# Patient Record
Sex: MALE | Race: WHITE | Employment: OTHER | URBAN - METROPOLITAN AREA
[De-identification: names, ages, dates, MRNs, and addresses within clinical notes are randomized per-mention and may not be internally consistent; named-entity substitution may affect disease eponyms.]

---

## 2017-08-20 ENCOUNTER — ED HISTORICAL/CONVERTED ENCOUNTER (OUTPATIENT)
Dept: OTHER | Age: 77
End: 2017-08-20

## 2017-10-03 ENCOUNTER — HOSPITAL ENCOUNTER (OUTPATIENT)
Dept: CT IMAGING | Age: 77
Discharge: HOME | End: 2017-10-03
Attending: ORTHOPAEDIC SURGERY
Payer: MEDICARE

## 2017-10-03 ENCOUNTER — HOSPITAL ENCOUNTER (OUTPATIENT)
Dept: GENERAL RADIOLOGY | Age: 77
Discharge: HOME | End: 2017-10-03
Attending: ORTHOPAEDIC SURGERY
Payer: MEDICARE

## 2017-10-03 VITALS
HEART RATE: 70 BPM | SYSTOLIC BLOOD PRESSURE: 165 MMHG | DIASTOLIC BLOOD PRESSURE: 99 MMHG | OXYGEN SATURATION: 97 % | RESPIRATION RATE: 20 BRPM

## 2017-10-03 DIAGNOSIS — M48.061 SPINAL STENOSIS, LUMBAR: ICD-10-CM

## 2017-10-03 PROCEDURE — 72132 CT LUMBAR SPINE W/DYE: CPT

## 2017-10-03 PROCEDURE — 74011636320 HC RX REV CODE- 636/320: Performed by: RADIOLOGY

## 2017-10-03 PROCEDURE — 77030018868 HC TY MYELGRM BD -A

## 2017-10-03 PROCEDURE — 62304 MYELOGRAPHY LUMBAR INJECTION: CPT

## 2017-10-03 PROCEDURE — 77030003666 HC NDL SPINAL BD -A

## 2017-10-03 RX ORDER — LIDOCAINE HYDROCHLORIDE 10 MG/ML
5 INJECTION, SOLUTION EPIDURAL; INFILTRATION; INTRACAUDAL; PERINEURAL
Status: DISCONTINUED | OUTPATIENT
Start: 2017-10-03 | End: 2017-10-03

## 2017-10-03 RX ADMIN — IOHEXOL 20 ML: 180 INJECTION INTRAVENOUS at 12:56

## 2017-10-03 NOTE — PROGRESS NOTES
Pt brought to [facility] on a stretcher laying supine. Pt held in the hallway until available space in [facility]. 1313 - Pt procedure site c/d/i.  VS taken (see doc flow). Pain reported as 0/10. Pt stated he has weakness vs pain. 1323 - Reviewed discharge instructions with patient and provided copy. Pt able to verbalize understanding and shared that he had a previous Myelogram approximately 4 years ago. Pt advised his BP was elevated during his post recovery the last time he was here and he has a follow up Cardiology appointment in the near future. Pt resting and drinking fluids. Pt's ride has been called per his request.    1329 - Pt resting. No complaints. 5 - Pt assisted with dressing. Procedure site c/d/i. Pain 0/10 but weakness noted when Pt ambulated to the wheel chair for discharge. Pt stated this is his baseline and reason for procedure. Pt given disc and signed authorization.   Pt taken in wheel chair to vehicle and female SO.

## 2017-10-03 NOTE — DISCHARGE INSTRUCTIONS
[address] [address]      POST LUMBAR PUNCTURE/MYELOGRAM/INTRATHECAL CHEMOTHERAPY DISCHARGE INSTRUCTIONS  General Information:  Lumbar Puncture: A LP is done to help diagnose several disorders, like pseudo tumor, migraines, meningitis, and multiple sclerosis. It involves a puncture (usually in the lower spine) into the sac that protects the spinal column. A sample of the fluid in that space is removed and tested in the lab. Myelogram:   A Myelogram involves a lumbar puncture, and instead of removing fluid, contrast will be injected into the sac surrounding the spinal column. It is done to visualize the spinal column, nerve roots, spinal canal, vertebral discs and disc space. It is usually done to diagnose back pain with unknown cause or in preparation for surgery. After the injection, a CT scan will be done, usually within two hours of the injection. Intrathecal Chemotherapy:   Chemotherapy can be given in many forms. Intrathecal chemo involves a lumbar puncture, and instead of removing fluid, the chemo will be injected into the space. After any of these procedures, you will be asked to lie flat on your back for 4-6 hours to prevent complications. You should also rest for 24 hours after you go home, and force fluids. If you have a headache, you should take Tylenol or acetaminophen. Call If:   You should call your Physician and/or the Radiology Nurse if you develop a headache that is not relieved by Tylenol, and worsens when you stand and eases when you lie down, you need to call. You may have developed what is referred to as a spinal headache. Our physicians will probably advise you to be on strict bed rest for 24 hours, to drink lots of fluids and caffeine. If this does not help the head pain, call again the next day. You should call if you have bleeding other than a small spot on your bandage.  You should call if you have any numbness, tingling, weakness, fever, chills, urinary retention, severe itching, rash, welts, swelling, or confusion. Follow-up Instructions: See the doctor who ordered your procedure as he/she has instructed. If you had a Lumbar Puncture or Myelogram, your results should be available to your ordering doctor in 3-5 business days. You can remove your dressing in 24 hours and shower regularly. Do not bathe or swim for 72 hours.        To Reach Us: [phone] or after hours call [phone]  Patient Signature:  Date: 10/3/2017  Discharging Nurse: Raoul Simmonds, RN

## 2024-01-26 ENCOUNTER — HOSPITAL ENCOUNTER (EMERGENCY)
Facility: HOSPITAL | Age: 84
Discharge: ANOTHER HOSPITAL | End: 2024-01-27
Attending: STUDENT IN AN ORGANIZED HEALTH CARE EDUCATION/TRAINING PROGRAM
Payer: MEDICARE

## 2024-01-26 DIAGNOSIS — D72.829 LEUKOCYTOSIS, UNSPECIFIED TYPE: ICD-10-CM

## 2024-01-26 DIAGNOSIS — N28.9 RENAL INSUFFICIENCY: ICD-10-CM

## 2024-01-26 DIAGNOSIS — D62 ACUTE BLOOD LOSS ANEMIA: ICD-10-CM

## 2024-01-26 DIAGNOSIS — K92.2 UPPER GI BLEED: Primary | ICD-10-CM

## 2024-01-26 LAB
ALBUMIN SERPL-MCNC: 2.2 G/DL (ref 3.5–5)
ALBUMIN/GLOB SERPL: 0.5 (ref 1.1–2.2)
ALP SERPL-CCNC: 120 U/L (ref 45–117)
ALT SERPL-CCNC: 17 U/L (ref 12–78)
ANION GAP SERPL CALC-SCNC: 5 MMOL/L (ref 5–15)
AST SERPL W P-5'-P-CCNC: 39 U/L (ref 15–37)
BASOPHILS # BLD: 0 K/UL (ref 0–0.1)
BASOPHILS NFR BLD: 0 % (ref 0–1)
BILIRUB SERPL-MCNC: 0.4 MG/DL (ref 0.2–1)
BUN SERPL-MCNC: 78 MG/DL (ref 6–20)
BUN/CREAT SERPL: 39 (ref 12–20)
CA-I BLD-MCNC: 10.6 MG/DL (ref 8.5–10.1)
CHLORIDE SERPL-SCNC: 114 MMOL/L (ref 97–108)
CO2 SERPL-SCNC: 28 MMOL/L (ref 21–32)
CREAT SERPL-MCNC: 2.01 MG/DL (ref 0.7–1.3)
DIFFERENTIAL METHOD BLD: ABNORMAL
EOSINOPHIL # BLD: 0 K/UL (ref 0–0.4)
EOSINOPHIL NFR BLD: 0 % (ref 0–7)
ERYTHROCYTE [DISTWIDTH] IN BLOOD BY AUTOMATED COUNT: 17.5 % (ref 11.5–14.5)
GLOBULIN SER CALC-MCNC: 4.2 G/DL (ref 2–4)
GLUCOSE SERPL-MCNC: 182 MG/DL (ref 65–100)
HCT VFR BLD AUTO: 21 % (ref 36.6–50.3)
HGB BLD-MCNC: 6.1 G/DL (ref 12.1–17)
IMM GRANULOCYTES # BLD AUTO: 0.2 K/UL (ref 0–0.04)
IMM GRANULOCYTES NFR BLD AUTO: 1 % (ref 0–0.5)
LYMPHOCYTES # BLD: 0.7 K/UL (ref 0.8–3.5)
LYMPHOCYTES NFR BLD: 4 % (ref 12–49)
MCH RBC QN AUTO: 26.2 PG (ref 26–34)
MCHC RBC AUTO-ENTMCNC: 29 G/DL (ref 30–36.5)
MCV RBC AUTO: 90.1 FL (ref 80–99)
MONOCYTES # BLD: 0.8 K/UL (ref 0–1)
MONOCYTES NFR BLD: 4 % (ref 5–13)
NEUTS SEG # BLD: 17.1 K/UL (ref 1.8–8)
NEUTS SEG NFR BLD: 91 % (ref 32–75)
NRBC # BLD: 0.07 K/UL (ref 0–0.01)
NRBC BLD-RTO: 0.4 PER 100 WBC
PLATELET # BLD AUTO: 467 K/UL (ref 150–400)
PMV BLD AUTO: 10.8 FL (ref 8.9–12.9)
POTASSIUM SERPL-SCNC: 3.5 MMOL/L (ref 3.5–5.1)
PROT SERPL-MCNC: 6.4 G/DL (ref 6.4–8.2)
RBC # BLD AUTO: 2.33 M/UL (ref 4.1–5.7)
SODIUM SERPL-SCNC: 147 MMOL/L (ref 136–145)
WBC # BLD AUTO: 18.7 K/UL (ref 4.1–11.1)

## 2024-01-26 PROCEDURE — 99285 EMERGENCY DEPT VISIT HI MDM: CPT

## 2024-01-26 PROCEDURE — 86901 BLOOD TYPING SEROLOGIC RH(D): CPT

## 2024-01-26 PROCEDURE — 86923 COMPATIBILITY TEST ELECTRIC: CPT

## 2024-01-26 PROCEDURE — 36415 COLL VENOUS BLD VENIPUNCTURE: CPT

## 2024-01-26 PROCEDURE — 86900 BLOOD TYPING SEROLOGIC ABO: CPT

## 2024-01-26 PROCEDURE — 80053 COMPREHEN METABOLIC PANEL: CPT

## 2024-01-26 PROCEDURE — P9016 RBC LEUKOCYTES REDUCED: HCPCS

## 2024-01-26 PROCEDURE — 86850 RBC ANTIBODY SCREEN: CPT

## 2024-01-26 PROCEDURE — 96374 THER/PROPH/DIAG INJ IV PUSH: CPT

## 2024-01-26 PROCEDURE — 85025 COMPLETE CBC W/AUTO DIFF WBC: CPT

## 2024-01-26 RX ORDER — 0.9 % SODIUM CHLORIDE 0.9 %
1000 INTRAVENOUS SOLUTION INTRAVENOUS ONCE
Status: COMPLETED | OUTPATIENT
Start: 2024-01-26 | End: 2024-01-27

## 2024-01-26 RX ORDER — SODIUM CHLORIDE 9 MG/ML
INJECTION, SOLUTION INTRAVENOUS PRN
Status: DISCONTINUED | OUTPATIENT
Start: 2024-01-26 | End: 2024-01-27 | Stop reason: HOSPADM

## 2024-01-26 ASSESSMENT — LIFESTYLE VARIABLES
HOW OFTEN DO YOU HAVE A DRINK CONTAINING ALCOHOL: NEVER
HOW MANY STANDARD DRINKS CONTAINING ALCOHOL DO YOU HAVE ON A TYPICAL DAY: PATIENT DOES NOT DRINK
HOW MANY STANDARD DRINKS CONTAINING ALCOHOL DO YOU HAVE ON A TYPICAL DAY: PATIENT DOES NOT DRINK
HOW OFTEN DO YOU HAVE A DRINK CONTAINING ALCOHOL: NEVER

## 2024-01-26 NOTE — ED TRIAGE NOTES
Pt arrived via ems from [facility] and rehab.     A&ox1 - baseline    Sent for low hgb.

## 2024-01-27 ENCOUNTER — HOSPITAL ENCOUNTER (INPATIENT)
Facility: HOSPITAL | Age: 84
LOS: 10 days | Discharge: NURSING, REHAB OR LONG-TERM CARE | DRG: 377 | End: 2024-02-06
Attending: HOSPITALIST | Admitting: HOSPITALIST
Payer: MEDICARE

## 2024-01-27 ENCOUNTER — APPOINTMENT (OUTPATIENT)
Facility: HOSPITAL | Age: 84
End: 2024-01-27
Payer: MEDICARE

## 2024-01-27 VITALS
DIASTOLIC BLOOD PRESSURE: 68 MMHG | WEIGHT: 160 LBS | HEART RATE: 101 BPM | OXYGEN SATURATION: 97 % | SYSTOLIC BLOOD PRESSURE: 126 MMHG | HEIGHT: 70 IN | TEMPERATURE: 97.6 F | BODY MASS INDEX: 22.9 KG/M2 | RESPIRATION RATE: 20 BRPM

## 2024-01-27 DIAGNOSIS — I48.91 ATRIAL FIBRILLATION WITH RAPID VENTRICULAR RESPONSE (HCC): Primary | ICD-10-CM

## 2024-01-27 PROBLEM — K92.2 GI BLEED: Status: ACTIVE | Noted: 2024-01-27

## 2024-01-27 LAB
ABO + RH BLD: NORMAL
ANION GAP SERPL CALC-SCNC: 8 MMOL/L (ref 5–15)
BASOPHILS # BLD: 0 K/UL (ref 0–0.1)
BASOPHILS NFR BLD: 0 % (ref 0–1)
BLD PROD TYP BPU: NORMAL
BLOOD BANK DISPENSE STATUS: NORMAL
BLOOD GROUP ANTIBODIES SERPL: NEGATIVE
BPU ID: NORMAL
BUN SERPL-MCNC: 71 MG/DL (ref 6–20)
BUN/CREAT SERPL: 42 (ref 12–20)
CALCIUM SERPL-MCNC: 11.5 MG/DL (ref 8.5–10.1)
CHLORIDE SERPL-SCNC: 117 MMOL/L (ref 97–108)
CO2 SERPL-SCNC: 29 MMOL/L (ref 21–32)
COLLECT DATE STL: ABNORMAL
CREAT SERPL-MCNC: 1.68 MG/DL (ref 0.7–1.3)
CROSSMATCH RESULT: NORMAL
DIFFERENTIAL METHOD BLD: ABNORMAL
EKG ATRIAL RATE: 95 BPM
EKG DIAGNOSIS: NORMAL
EKG P-R INTERVAL: 112 MS
EKG Q-T INTERVAL: 362 MS
EKG QRS DURATION: 110 MS
EKG QTC CALCULATION (BAZETT): 454 MS
EKG R AXIS: 43 DEGREES
EKG T AXIS: 216 DEGREES
EKG VENTRICULAR RATE: 95 BPM
EOSINOPHIL # BLD: 0 K/UL (ref 0–0.4)
EOSINOPHIL NFR BLD: 0 % (ref 0–7)
ERYTHROCYTE [DISTWIDTH] IN BLOOD BY AUTOMATED COUNT: 17.2 % (ref 11.5–14.5)
GLUCOSE SERPL-MCNC: 150 MG/DL (ref 65–100)
HAPTOGLOB SERPL-MCNC: 350 MG/DL (ref 30–200)
HCT VFR BLD AUTO: 27.4 % (ref 36.6–50.3)
HCT VFR BLD AUTO: 27.5 % (ref 36.6–50.3)
HEMOCCULT SP1 STL QL: POSITIVE
HGB BLD-MCNC: 8 G/DL (ref 12.1–17)
HGB BLD-MCNC: 8 G/DL (ref 12.1–17)
IMM GRANULOCYTES # BLD AUTO: 0.2 K/UL (ref 0–0.04)
IMM GRANULOCYTES NFR BLD AUTO: 1 % (ref 0–0.5)
INR PPP: 1 (ref 0.9–1.1)
LACTATE SERPL-SCNC: 1.7 MMOL/L (ref 0.4–2)
LDH SERPL L TO P-CCNC: 187 U/L (ref 85–241)
LYMPHOCYTES # BLD: 0.7 K/UL (ref 0.8–3.5)
LYMPHOCYTES NFR BLD: 4 % (ref 12–49)
MAGNESIUM SERPL-MCNC: 2.7 MG/DL (ref 1.6–2.4)
MCH RBC QN AUTO: 27.3 PG (ref 26–34)
MCHC RBC AUTO-ENTMCNC: 29.1 G/DL (ref 30–36.5)
MCV RBC AUTO: 93.9 FL (ref 80–99)
MONOCYTES # BLD: 1.1 K/UL (ref 0–1)
MONOCYTES NFR BLD: 6 % (ref 5–13)
NEUTS SEG # BLD: 16.1 K/UL (ref 1.8–8)
NEUTS SEG NFR BLD: 89 % (ref 32–75)
NRBC # BLD: 0.05 K/UL (ref 0–0.01)
NRBC BLD-RTO: 0.3 PER 100 WBC
PLATELET # BLD AUTO: 446 K/UL (ref 150–400)
PMV BLD AUTO: 10.3 FL (ref 8.9–12.9)
POTASSIUM SERPL-SCNC: 3.4 MMOL/L (ref 3.5–5.1)
PROCALCITONIN SERPL-MCNC: 0.23 NG/ML
PROTHROMBIN TIME: 13.7 SEC (ref 11.9–14.6)
RBC # BLD AUTO: 2.93 M/UL (ref 4.1–5.7)
RBC MORPH BLD: ABNORMAL
RETICS # AUTO: 0.23 M/UL (ref 0.03–0.1)
RETICS/RBC NFR AUTO: 7.7 % (ref 0.7–2.1)
SARS-COV-2 RDRP RESP QL NAA+PROBE: NOT DETECTED
SODIUM SERPL-SCNC: 154 MMOL/L (ref 136–145)
SPECIMEN EXP DATE BLD: NORMAL
TRANSFUSION STATUS PATIENT QL: NORMAL
UNIT DIVISION: 0
WBC # BLD AUTO: 18.1 K/UL (ref 4.1–11.1)

## 2024-01-27 PROCEDURE — 87040 BLOOD CULTURE FOR BACTERIA: CPT

## 2024-01-27 PROCEDURE — 85610 PROTHROMBIN TIME: CPT

## 2024-01-27 PROCEDURE — 83615 LACTATE (LD) (LDH) ENZYME: CPT

## 2024-01-27 PROCEDURE — 71045 X-RAY EXAM CHEST 1 VIEW: CPT

## 2024-01-27 PROCEDURE — 1100000003 HC PRIVATE W/ TELEMETRY

## 2024-01-27 PROCEDURE — 86900 BLOOD TYPING SEROLOGIC ABO: CPT

## 2024-01-27 PROCEDURE — 86923 COMPATIBILITY TEST ELECTRIC: CPT

## 2024-01-27 PROCEDURE — 6360000002 HC RX W HCPCS: Performed by: INTERNAL MEDICINE

## 2024-01-27 PROCEDURE — 85018 HEMOGLOBIN: CPT

## 2024-01-27 PROCEDURE — 86850 RBC ANTIBODY SCREEN: CPT

## 2024-01-27 PROCEDURE — 83735 ASSAY OF MAGNESIUM: CPT

## 2024-01-27 PROCEDURE — 84145 PROCALCITONIN (PCT): CPT

## 2024-01-27 PROCEDURE — 82272 OCCULT BLD FECES 1-3 TESTS: CPT

## 2024-01-27 PROCEDURE — 83605 ASSAY OF LACTIC ACID: CPT

## 2024-01-27 PROCEDURE — 85014 HEMATOCRIT: CPT

## 2024-01-27 PROCEDURE — 6370000000 HC RX 637 (ALT 250 FOR IP): Performed by: INTERNAL MEDICINE

## 2024-01-27 PROCEDURE — 85045 AUTOMATED RETICULOCYTE COUNT: CPT

## 2024-01-27 PROCEDURE — 85025 COMPLETE CBC W/AUTO DIFF WBC: CPT

## 2024-01-27 PROCEDURE — 6360000002 HC RX W HCPCS: Performed by: HOSPITALIST

## 2024-01-27 PROCEDURE — A4216 STERILE WATER/SALINE, 10 ML: HCPCS | Performed by: HOSPITALIST

## 2024-01-27 PROCEDURE — 83010 ASSAY OF HAPTOGLOBIN QUANT: CPT

## 2024-01-27 PROCEDURE — 6370000000 HC RX 637 (ALT 250 FOR IP): Performed by: HOSPITALIST

## 2024-01-27 PROCEDURE — 2580000003 HC RX 258: Performed by: STUDENT IN AN ORGANIZED HEALTH CARE EDUCATION/TRAINING PROGRAM

## 2024-01-27 PROCEDURE — 86901 BLOOD TYPING SEROLOGIC RH(D): CPT

## 2024-01-27 PROCEDURE — C9113 INJ PANTOPRAZOLE SODIUM, VIA: HCPCS | Performed by: HOSPITALIST

## 2024-01-27 PROCEDURE — 2580000003 HC RX 258: Performed by: HOSPITALIST

## 2024-01-27 PROCEDURE — 80048 BASIC METABOLIC PNL TOTAL CA: CPT

## 2024-01-27 PROCEDURE — 6360000002 HC RX W HCPCS: Performed by: STUDENT IN AN ORGANIZED HEALTH CARE EDUCATION/TRAINING PROGRAM

## 2024-01-27 PROCEDURE — 93005 ELECTROCARDIOGRAM TRACING: CPT | Performed by: STUDENT IN AN ORGANIZED HEALTH CARE EDUCATION/TRAINING PROGRAM

## 2024-01-27 PROCEDURE — 87635 SARS-COV-2 COVID-19 AMP PRB: CPT

## 2024-01-27 PROCEDURE — 36415 COLL VENOUS BLD VENIPUNCTURE: CPT

## 2024-01-27 RX ORDER — FINASTERIDE 5 MG/1
5 TABLET, FILM COATED ORAL DAILY
COMMUNITY

## 2024-01-27 RX ORDER — ONDANSETRON 2 MG/ML
4 INJECTION INTRAMUSCULAR; INTRAVENOUS EVERY 6 HOURS PRN
Status: DISCONTINUED | OUTPATIENT
Start: 2024-01-27 | End: 2024-02-07 | Stop reason: HOSPADM

## 2024-01-27 RX ORDER — POTASSIUM CHLORIDE 1.5 G/1.58G
40 POWDER, FOR SOLUTION ORAL ONCE
Status: COMPLETED | OUTPATIENT
Start: 2024-01-27 | End: 2024-01-27

## 2024-01-27 RX ORDER — GUAIFENESIN/DEXTROMETHORPHAN 100-10MG/5
10 SYRUP ORAL EVERY 4 HOURS PRN
Status: ON HOLD | COMMUNITY
End: 2024-02-06 | Stop reason: HOSPADM

## 2024-01-27 RX ORDER — POTASSIUM CHLORIDE 20 MEQ/1
40 TABLET, EXTENDED RELEASE ORAL ONCE
Status: DISCONTINUED | OUTPATIENT
Start: 2024-01-27 | End: 2024-01-27

## 2024-01-27 RX ORDER — ACETAMINOPHEN 650 MG/1
650 SUPPOSITORY RECTAL EVERY 6 HOURS PRN
Status: DISCONTINUED | OUTPATIENT
Start: 2024-01-27 | End: 2024-02-07 | Stop reason: HOSPADM

## 2024-01-27 RX ORDER — MAGNESIUM SULFATE IN WATER 40 MG/ML
2000 INJECTION, SOLUTION INTRAVENOUS PRN
Status: DISCONTINUED | OUTPATIENT
Start: 2024-01-27 | End: 2024-01-30

## 2024-01-27 RX ORDER — FERROUS SULFATE 325(65) MG
325 TABLET ORAL 2 TIMES DAILY
COMMUNITY

## 2024-01-27 RX ORDER — FUROSEMIDE 20 MG/1
20 TABLET ORAL DAILY
Status: ON HOLD | COMMUNITY
End: 2024-02-06 | Stop reason: HOSPADM

## 2024-01-27 RX ORDER — SODIUM CHLORIDE AND POTASSIUM CHLORIDE 150; 450 MG/100ML; MG/100ML
INJECTION, SOLUTION INTRAVENOUS CONTINUOUS
Status: DISCONTINUED | OUTPATIENT
Start: 2024-01-27 | End: 2024-01-28

## 2024-01-27 RX ORDER — ASCORBIC ACID 500 MG
500 TABLET ORAL DAILY
COMMUNITY

## 2024-01-27 RX ORDER — POTASSIUM CHLORIDE 20 MEQ/1
40 TABLET, EXTENDED RELEASE ORAL PRN
Status: DISCONTINUED | OUTPATIENT
Start: 2024-01-27 | End: 2024-01-30

## 2024-01-27 RX ORDER — ATORVASTATIN CALCIUM 40 MG/1
40 TABLET, FILM COATED ORAL DAILY
COMMUNITY

## 2024-01-27 RX ORDER — TAMSULOSIN HYDROCHLORIDE 0.4 MG/1
0.4 CAPSULE ORAL 2 TIMES DAILY
COMMUNITY

## 2024-01-27 RX ORDER — ACETAMINOPHEN 325 MG/1
650 TABLET ORAL EVERY 6 HOURS PRN
Status: DISCONTINUED | OUTPATIENT
Start: 2024-01-27 | End: 2024-02-07 | Stop reason: HOSPADM

## 2024-01-27 RX ORDER — METHYLPREDNISOLONE 4 MG/1
4 TABLET ORAL SEE ADMIN INSTRUCTIONS
Status: ON HOLD | COMMUNITY
End: 2024-02-06 | Stop reason: HOSPADM

## 2024-01-27 RX ORDER — ZOLPIDEM TARTRATE 5 MG/1
5 TABLET ORAL NIGHTLY PRN
Status: DISCONTINUED | OUTPATIENT
Start: 2024-01-27 | End: 2024-02-07 | Stop reason: HOSPADM

## 2024-01-27 RX ORDER — POTASSIUM CHLORIDE 1.5 G/1.58G
40 POWDER, FOR SOLUTION ORAL PRN
Status: DISCONTINUED | OUTPATIENT
Start: 2024-01-27 | End: 2024-01-30

## 2024-01-27 RX ORDER — PANTOPRAZOLE SODIUM 40 MG/10ML
40 INJECTION, POWDER, LYOPHILIZED, FOR SOLUTION INTRAVENOUS DAILY
Status: DISCONTINUED | OUTPATIENT
Start: 2024-01-27 | End: 2024-01-27 | Stop reason: HOSPADM

## 2024-01-27 RX ORDER — NITROGLYCERIN 0.4 MG/1
0.4 TABLET SUBLINGUAL EVERY 5 MIN PRN
COMMUNITY

## 2024-01-27 RX ORDER — FINASTERIDE 5 MG/1
5 TABLET, FILM COATED ORAL DAILY
Status: DISCONTINUED | OUTPATIENT
Start: 2024-01-27 | End: 2024-02-07 | Stop reason: HOSPADM

## 2024-01-27 RX ORDER — POTASSIUM CHLORIDE 7.45 MG/ML
10 INJECTION INTRAVENOUS PRN
Status: DISCONTINUED | OUTPATIENT
Start: 2024-01-27 | End: 2024-01-30

## 2024-01-27 RX ORDER — SODIUM CHLORIDE 9 MG/ML
INJECTION, SOLUTION INTRAVENOUS PRN
Status: DISCONTINUED | OUTPATIENT
Start: 2024-01-27 | End: 2024-02-07 | Stop reason: HOSPADM

## 2024-01-27 RX ORDER — IPRATROPIUM BROMIDE AND ALBUTEROL SULFATE 2.5; .5 MG/3ML; MG/3ML
1 SOLUTION RESPIRATORY (INHALATION) EVERY 6 HOURS PRN
COMMUNITY

## 2024-01-27 RX ORDER — M-VIT,TX,IRON,MINS/CALC/FOLIC 27MG-0.4MG
1 TABLET ORAL DAILY
COMMUNITY

## 2024-01-27 RX ORDER — ONDANSETRON 4 MG/1
4 TABLET, FILM COATED ORAL EVERY 4 HOURS PRN
COMMUNITY

## 2024-01-27 RX ORDER — SODIUM CHLORIDE 0.9 % (FLUSH) 0.9 %
5-40 SYRINGE (ML) INJECTION PRN
Status: DISCONTINUED | OUTPATIENT
Start: 2024-01-27 | End: 2024-02-07 | Stop reason: HOSPADM

## 2024-01-27 RX ORDER — TAMSULOSIN HYDROCHLORIDE 0.4 MG/1
0.4 CAPSULE ORAL 2 TIMES DAILY
Status: DISCONTINUED | OUTPATIENT
Start: 2024-01-27 | End: 2024-02-07 | Stop reason: HOSPADM

## 2024-01-27 RX ORDER — AMLODIPINE BESYLATE 5 MG/1
5 TABLET ORAL DAILY
Status: ON HOLD | COMMUNITY
End: 2024-02-06 | Stop reason: HOSPADM

## 2024-01-27 RX ORDER — CASTOR OIL AND BALSAM, PERU 788; 87 MG/G; MG/G
OINTMENT TOPICAL 2 TIMES DAILY
Status: DISCONTINUED | OUTPATIENT
Start: 2024-01-27 | End: 2024-02-07 | Stop reason: HOSPADM

## 2024-01-27 RX ORDER — ONDANSETRON 4 MG/1
4 TABLET, ORALLY DISINTEGRATING ORAL EVERY 8 HOURS PRN
Status: DISCONTINUED | OUTPATIENT
Start: 2024-01-27 | End: 2024-02-07 | Stop reason: HOSPADM

## 2024-01-27 RX ORDER — ENZALUTAMIDE 40 MG/1
40 TABLET ORAL DAILY
COMMUNITY

## 2024-01-27 RX ORDER — SODIUM CHLORIDE 0.9 % (FLUSH) 0.9 %
5-40 SYRINGE (ML) INJECTION EVERY 12 HOURS SCHEDULED
Status: DISCONTINUED | OUTPATIENT
Start: 2024-01-27 | End: 2024-02-07 | Stop reason: HOSPADM

## 2024-01-27 RX ORDER — ATORVASTATIN CALCIUM 40 MG/1
40 TABLET, FILM COATED ORAL DAILY
Status: DISCONTINUED | OUTPATIENT
Start: 2024-01-27 | End: 2024-02-07 | Stop reason: HOSPADM

## 2024-01-27 RX ORDER — METOPROLOL SUCCINATE 100 MG/1
100 TABLET, EXTENDED RELEASE ORAL DAILY
Status: ON HOLD | COMMUNITY
End: 2024-02-06 | Stop reason: HOSPADM

## 2024-01-27 RX ORDER — METOPROLOL SUCCINATE 50 MG/1
50 TABLET, EXTENDED RELEASE ORAL DAILY
Status: DISCONTINUED | OUTPATIENT
Start: 2024-01-27 | End: 2024-02-06 | Stop reason: SDUPTHER

## 2024-01-27 RX ORDER — ZOLPIDEM TARTRATE 5 MG/1
5 TABLET ORAL NIGHTLY PRN
COMMUNITY

## 2024-01-27 RX ORDER — POLYETHYLENE GLYCOL 3350 17 G/17G
17 POWDER, FOR SOLUTION ORAL DAILY PRN
Status: DISCONTINUED | OUTPATIENT
Start: 2024-01-27 | End: 2024-02-07 | Stop reason: HOSPADM

## 2024-01-27 RX ORDER — SODIUM CHLORIDE 9 MG/ML
INJECTION, SOLUTION INTRAVENOUS CONTINUOUS
Status: DISCONTINUED | OUTPATIENT
Start: 2024-01-27 | End: 2024-01-27

## 2024-01-27 RX ADMIN — PANTOPRAZOLE SODIUM 40 MG: 40 INJECTION, POWDER, FOR SOLUTION INTRAVENOUS at 08:42

## 2024-01-27 RX ADMIN — PANTOPRAZOLE SODIUM 40 MG: 40 INJECTION, POWDER, FOR SOLUTION INTRAVENOUS at 18:34

## 2024-01-27 RX ADMIN — METOPROLOL SUCCINATE 50 MG: 50 TABLET, EXTENDED RELEASE ORAL at 10:56

## 2024-01-27 RX ADMIN — TAMSULOSIN HYDROCHLORIDE 0.4 MG: 0.4 CAPSULE ORAL at 10:56

## 2024-01-27 RX ADMIN — Medication: at 23:02

## 2024-01-27 RX ADMIN — SODIUM CHLORIDE, PRESERVATIVE FREE 10 ML: 5 INJECTION INTRAVENOUS at 08:43

## 2024-01-27 RX ADMIN — TAMSULOSIN HYDROCHLORIDE 0.4 MG: 0.4 CAPSULE ORAL at 23:02

## 2024-01-27 RX ADMIN — FINASTERIDE 5 MG: 5 TABLET, FILM COATED ORAL at 10:56

## 2024-01-27 RX ADMIN — POTASSIUM CHLORIDE AND SODIUM CHLORIDE: 450; 150 INJECTION, SOLUTION INTRAVENOUS at 18:33

## 2024-01-27 RX ADMIN — SODIUM CHLORIDE: 9 INJECTION, SOLUTION INTRAVENOUS at 08:03

## 2024-01-27 RX ADMIN — SODIUM CHLORIDE 1000 ML: 9 INJECTION, SOLUTION INTRAVENOUS at 01:17

## 2024-01-27 RX ADMIN — ATORVASTATIN CALCIUM 40 MG: 40 TABLET, FILM COATED ORAL at 10:56

## 2024-01-27 RX ADMIN — CEFTRIAXONE SODIUM 1000 MG: 1 INJECTION, POWDER, FOR SOLUTION INTRAMUSCULAR; INTRAVENOUS at 01:17

## 2024-01-27 RX ADMIN — POTASSIUM CHLORIDE 40 MEQ: 1.5 POWDER, FOR SOLUTION ORAL at 18:46

## 2024-01-27 NOTE — ED PROVIDER NOTES
Collection Time: 01/27/24 12:06 AM   Result Value Ref Range    Occult Blood, Stool #1 Positive (A) Negative      Date 75,016         EKG: See ED course    Radiologic Studies:  Non-plain film images such as CT, Ultrasound and MRI are read by the radiologist. Plain radiographic images are visualized and preliminarily interpreted by the ED Physician with the following findings: Chest x-ray reviewed by myself, bibasilar patchy infiltrates,    Interpretation per the Radiologist below, if available at the time of this note:  XR CHEST PORTABLE   Final Result      Patchy bilateral airspace disease may represent atelectasis or pneumonia.              ED COURSE and DIFFERENTIAL DIAGNOSIS/MDM   12:54 AM DDx, ED Course, and Reassessment: 83-year-old male history of atrial fibrillation, heart failure, chronic kidney disease presents emergency department for low hemoglobin from group home.    Physical exam shows chronically ill-appearing elderly male in no distress, mostly nonverbal during interview however answer simple questions, in no apparent distress, mild tachycardia noted in triage.    Differential includes anemia of chronic disease, lower GI bleed, anemia related to kidney disease.    Will draw basic lab work, type and screen, anticipate transfusion, if lab work within normal limits feel the patient may be able to be transfused and discharged back to nursing home    Records Reviewed (source and summary of external notes): Prior medical records and Nursing notes    Vitals:    Vitals:    01/26/24 2145 01/26/24 2201 01/26/24 2215 01/26/24 2330   BP: 110/72 113/66 112/66 118/63   Pulse: (!) 101 (!) 107 99 92   Resp: 24 28 19 16   Temp:  97.6 °F (36.4 °C) 98.2 °F (36.8 °C) 97.6 °F (36.4 °C)   TempSrc:  Axillary Axillary Axillary   SpO2: 98% 98% 99% 96%   Weight:       Height:            ED COURSE  ED Course as of 01/27/24 0054   Fri Jan 26, 2024 2042 Patient's lab work resulted, CBC shows leukocytosis 18,000, anemia

## 2024-01-27 NOTE — H&P
Hospitalist Admission Note    NAME: Quinn Amador   :  1940   MRN:  [id number]     Date/Time:  2024 5:32 AM    Patient PCP: David Castorena MD    Please note that this dictation was completed with DigitalMR, the computer voice recognition software.  Quite often unanticipated grammatical, syntax, homophones, and other interpretive errors are inadvertently transcribed by the computer software.  Please disregard these errors.  Please excuse any errors that have escaped final proofreading  ______________________________________________________________________  Given the patient's current clinical presentation, I have a high level of concern for decompensation if discharged from the emergency department.  Complex decision making was performed, which includes reviewing the patient's available past medical records, laboratory results, and x-ray films.       My assessment of this patient's clinical condition and my plan of care is as follows.    Assessment / Plan:    Active Problems and Plan:  Upper GI bleed, acute blood loss anemia with heme positive melena  Iron deficiency anemia  -- Hemoglobin 5.9 at nursing home and 6.1.  -- IV Protonix every 12h.  Consult GI; reportedly he was hospitalized at [facility] earlier this month with GI bleed and had a EGD, result unknown, Eliquis was stopped  -- Recheck CBC now and transfuse as needed for hemoglobin less than 7.  Got 1 unit of blood at [place] yesterday  -- Hold oral iron    Leukocytosis and tachycardia, r/o sepsis  -- Low suspicion for sepsis at this time.  Check procalcitonin, blood cultures, UA.  Chest x-ray with atelectasis versus pneumonia.  Rapid COVID was negative.  Low suspicion for pneumonia as patient without cough, shortness of breath, fever or hypoxia  -- Leukocytosis is due to recent start on Medrol Dosepak at nursing home for bronchitis.  Tachycardia due to blood loss anemia    CHF EF unknown  HTN  --does not seem volume overloaded  --hold

## 2024-01-27 NOTE — DISCHARGE INSTR - COC
{Esignature:260840509}    PHYSICIAN SECTION    Prognosis: {Prognosis:1826212044}    Condition at Discharge: { Patient Condition:474892002}    Rehab Potential (if transferring to Rehab): {Prognosis:9810913896}    Recommended Labs or Other Treatments After Discharge: ***    Physician Certification: I certify the above information and transfer of Quinn Amador  is necessary for the continuing treatment of the diagnosis listed and that he requires {Admit to Appropriate Level of Care:72065} for {GREATER/LESS:778720699} 30 days.     Update Admission H&P: {CHP DME Changes in HandP:123814742}    PHYSICIAN SIGNATURE:  {Esignature:419592706}

## 2024-01-28 LAB
ALBUMIN SERPL-MCNC: 2.2 G/DL (ref 3.5–5)
ALBUMIN/GLOB SERPL: 0.6 (ref 1.1–2.2)
ALP SERPL-CCNC: 116 U/L (ref 45–117)
ALT SERPL-CCNC: 12 U/L (ref 12–78)
ANION GAP SERPL CALC-SCNC: 1 MMOL/L (ref 5–15)
ANION GAP SERPL CALC-SCNC: 4 MMOL/L (ref 5–15)
AST SERPL-CCNC: 19 U/L (ref 15–37)
BACTERIA SPEC CULT: NORMAL
BACTERIA SPEC CULT: NORMAL
BASOPHILS # BLD: 0 K/UL (ref 0–0.1)
BASOPHILS NFR BLD: 0 % (ref 0–1)
BILIRUB SERPL-MCNC: 0.7 MG/DL (ref 0.2–1)
BUN SERPL-MCNC: 42 MG/DL (ref 6–20)
BUN SERPL-MCNC: 57 MG/DL (ref 6–20)
BUN/CREAT SERPL: 28 (ref 12–20)
BUN/CREAT SERPL: 39 (ref 12–20)
CALCIUM SERPL-MCNC: 10.4 MG/DL (ref 8.5–10.1)
CALCIUM SERPL-MCNC: 10.8 MG/DL (ref 8.5–10.1)
CHLORIDE SERPL-SCNC: 124 MMOL/L (ref 97–108)
CHLORIDE SERPL-SCNC: 126 MMOL/L (ref 97–108)
CO2 SERPL-SCNC: 23 MMOL/L (ref 21–32)
CO2 SERPL-SCNC: 27 MMOL/L (ref 21–32)
CREAT SERPL-MCNC: 1.47 MG/DL (ref 0.7–1.3)
CREAT SERPL-MCNC: 1.49 MG/DL (ref 0.7–1.3)
DIFFERENTIAL METHOD BLD: ABNORMAL
EOSINOPHIL # BLD: 0 K/UL (ref 0–0.4)
EOSINOPHIL NFR BLD: 0 % (ref 0–7)
ERYTHROCYTE [DISTWIDTH] IN BLOOD BY AUTOMATED COUNT: 17.6 % (ref 11.5–14.5)
GLOBULIN SER CALC-MCNC: 3.5 G/DL (ref 2–4)
GLUCOSE SERPL-MCNC: 141 MG/DL (ref 65–100)
GLUCOSE SERPL-MCNC: 151 MG/DL (ref 65–100)
HCT VFR BLD AUTO: 24.4 % (ref 36.6–50.3)
HCT VFR BLD AUTO: 25.8 % (ref 36.6–50.3)
HGB BLD-MCNC: 7 G/DL (ref 12.1–17)
HGB BLD-MCNC: 7.2 G/DL (ref 12.1–17)
IMM GRANULOCYTES # BLD AUTO: 0.1 K/UL (ref 0–0.04)
IMM GRANULOCYTES NFR BLD AUTO: 1 % (ref 0–0.5)
LYMPHOCYTES # BLD: 0.5 K/UL (ref 0.8–3.5)
LYMPHOCYTES NFR BLD: 4 % (ref 12–49)
Lab: NORMAL
Lab: NORMAL
MCH RBC QN AUTO: 27.3 PG (ref 26–34)
MCHC RBC AUTO-ENTMCNC: 28.7 G/DL (ref 30–36.5)
MCV RBC AUTO: 95.3 FL (ref 80–99)
MONOCYTES # BLD: 0.6 K/UL (ref 0–1)
MONOCYTES NFR BLD: 5 % (ref 5–13)
NEUTS SEG # BLD: 11 K/UL (ref 1.8–8)
NEUTS SEG NFR BLD: 90 % (ref 32–75)
NRBC # BLD: 0.03 K/UL (ref 0–0.01)
NRBC BLD-RTO: 0.2 PER 100 WBC
PLATELET # BLD AUTO: 348 K/UL (ref 150–400)
PMV BLD AUTO: 10.5 FL (ref 8.9–12.9)
POTASSIUM SERPL-SCNC: 4.3 MMOL/L (ref 3.5–5.1)
POTASSIUM SERPL-SCNC: 4.5 MMOL/L (ref 3.5–5.1)
PROT SERPL-MCNC: 5.7 G/DL (ref 6.4–8.2)
RBC # BLD AUTO: 2.56 M/UL (ref 4.1–5.7)
RBC MORPH BLD: ABNORMAL
RBC MORPH BLD: ABNORMAL
SODIUM SERPL-SCNC: 151 MMOL/L (ref 136–145)
SODIUM SERPL-SCNC: 154 MMOL/L (ref 136–145)
WBC # BLD AUTO: 12.2 K/UL (ref 4.1–11.1)

## 2024-01-28 PROCEDURE — 1100000003 HC PRIVATE W/ TELEMETRY

## 2024-01-28 PROCEDURE — 2500000003 HC RX 250 WO HCPCS: Performed by: INTERNAL MEDICINE

## 2024-01-28 PROCEDURE — 36415 COLL VENOUS BLD VENIPUNCTURE: CPT

## 2024-01-28 PROCEDURE — 2580000003 HC RX 258: Performed by: HOSPITALIST

## 2024-01-28 PROCEDURE — 6370000000 HC RX 637 (ALT 250 FOR IP): Performed by: INTERNAL MEDICINE

## 2024-01-28 PROCEDURE — 6360000002 HC RX W HCPCS: Performed by: INTERNAL MEDICINE

## 2024-01-28 PROCEDURE — 6370000000 HC RX 637 (ALT 250 FOR IP): Performed by: HOSPITALIST

## 2024-01-28 PROCEDURE — A4216 STERILE WATER/SALINE, 10 ML: HCPCS | Performed by: HOSPITALIST

## 2024-01-28 PROCEDURE — 6360000002 HC RX W HCPCS: Performed by: HOSPITALIST

## 2024-01-28 PROCEDURE — 85025 COMPLETE CBC W/AUTO DIFF WBC: CPT

## 2024-01-28 PROCEDURE — C9113 INJ PANTOPRAZOLE SODIUM, VIA: HCPCS | Performed by: HOSPITALIST

## 2024-01-28 PROCEDURE — 85014 HEMATOCRIT: CPT

## 2024-01-28 PROCEDURE — 2700000000 HC OXYGEN THERAPY PER DAY

## 2024-01-28 PROCEDURE — 85018 HEMOGLOBIN: CPT

## 2024-01-28 PROCEDURE — 80053 COMPREHEN METABOLIC PANEL: CPT

## 2024-01-28 RX ORDER — DEXTROSE, SODIUM CHLORIDE, AND POTASSIUM CHLORIDE 5; .2; .15 G/100ML; G/100ML; G/100ML
INJECTION INTRAVENOUS CONTINUOUS
Status: DISCONTINUED | OUTPATIENT
Start: 2024-01-28 | End: 2024-01-29

## 2024-01-28 RX ADMIN — TAMSULOSIN HYDROCHLORIDE 0.4 MG: 0.4 CAPSULE ORAL at 09:16

## 2024-01-28 RX ADMIN — POTASSIUM CHLORIDE AND SODIUM CHLORIDE: 450; 150 INJECTION, SOLUTION INTRAVENOUS at 02:13

## 2024-01-28 RX ADMIN — Medication: at 22:23

## 2024-01-28 RX ADMIN — DEXTROSE MONOHYDRATE, SODIUM CHLORIDE, AND POTASSIUM CHLORIDE: 50; 2.25; 1.49 INJECTION, SOLUTION INTRAVENOUS at 18:12

## 2024-01-28 RX ADMIN — Medication: at 09:09

## 2024-01-28 RX ADMIN — TAMSULOSIN HYDROCHLORIDE 0.4 MG: 0.4 CAPSULE ORAL at 22:22

## 2024-01-28 RX ADMIN — PANTOPRAZOLE SODIUM 40 MG: 40 INJECTION, POWDER, FOR SOLUTION INTRAVENOUS at 18:07

## 2024-01-28 RX ADMIN — POTASSIUM CHLORIDE AND SODIUM CHLORIDE: 450; 150 INJECTION, SOLUTION INTRAVENOUS at 12:02

## 2024-01-28 RX ADMIN — METOPROLOL SUCCINATE 50 MG: 50 TABLET, EXTENDED RELEASE ORAL at 09:09

## 2024-01-28 RX ADMIN — ATORVASTATIN CALCIUM 40 MG: 40 TABLET, FILM COATED ORAL at 09:10

## 2024-01-28 RX ADMIN — PANTOPRAZOLE SODIUM 40 MG: 40 INJECTION, POWDER, FOR SOLUTION INTRAVENOUS at 06:54

## 2024-01-28 RX ADMIN — FINASTERIDE 5 MG: 5 TABLET, FILM COATED ORAL at 09:10

## 2024-01-29 ENCOUNTER — APPOINTMENT (OUTPATIENT)
Facility: HOSPITAL | Age: 84
DRG: 377 | End: 2024-01-29
Attending: HOSPITALIST
Payer: MEDICARE

## 2024-01-29 LAB
ALBUMIN SERPL-MCNC: 1.9 G/DL (ref 3.5–5)
ALBUMIN/GLOB SERPL: 0.5 (ref 1.1–2.2)
ALP SERPL-CCNC: 119 U/L (ref 45–117)
ALT SERPL-CCNC: 11 U/L (ref 12–78)
ANION GAP SERPL CALC-SCNC: 4 MMOL/L (ref 5–15)
AST SERPL-CCNC: 14 U/L (ref 15–37)
BASE DEFICIT BLDV-SCNC: 0.3 MMOL/L
BASOPHILS # BLD: 0 K/UL (ref 0–0.1)
BASOPHILS NFR BLD: 0 % (ref 0–1)
BDY SITE: ABNORMAL
BILIRUB SERPL-MCNC: 0.7 MG/DL (ref 0.2–1)
BUN SERPL-MCNC: 34 MG/DL (ref 6–20)
BUN/CREAT SERPL: 27 (ref 12–20)
CALCIUM SERPL-MCNC: 10.9 MG/DL (ref 8.5–10.1)
CHLORIDE SERPL-SCNC: 118 MMOL/L (ref 97–108)
CO2 SERPL-SCNC: 25 MMOL/L (ref 21–32)
CREAT SERPL-MCNC: 1.27 MG/DL (ref 0.7–1.3)
DIFFERENTIAL METHOD BLD: ABNORMAL
EOSINOPHIL # BLD: 0.1 K/UL (ref 0–0.4)
EOSINOPHIL NFR BLD: 1 % (ref 0–7)
ERYTHROCYTE [DISTWIDTH] IN BLOOD BY AUTOMATED COUNT: 17.2 % (ref 11.5–14.5)
FIO2 ON VENT: 50 %
GLOBULIN SER CALC-MCNC: 3.8 G/DL (ref 2–4)
GLUCOSE SERPL-MCNC: 143 MG/DL (ref 65–100)
HCO3 BLDV-SCNC: 26 MMOL/L (ref 23–28)
HCT VFR BLD AUTO: 24.5 % (ref 36.6–50.3)
HGB BLD-MCNC: 6.9 G/DL (ref 12.1–17)
HISTORY CHECK: NORMAL
IMM GRANULOCYTES # BLD AUTO: 0.1 K/UL (ref 0–0.04)
IMM GRANULOCYTES NFR BLD AUTO: 1 % (ref 0–0.5)
LYMPHOCYTES # BLD: 0.5 K/UL (ref 0.8–3.5)
LYMPHOCYTES NFR BLD: 5 % (ref 12–49)
MCH RBC QN AUTO: 26.5 PG (ref 26–34)
MCHC RBC AUTO-ENTMCNC: 28.2 G/DL (ref 30–36.5)
MCV RBC AUTO: 94.2 FL (ref 80–99)
MONOCYTES # BLD: 0.6 K/UL (ref 0–1)
MONOCYTES NFR BLD: 5 % (ref 5–13)
NEUTS SEG # BLD: 9.3 K/UL (ref 1.8–8)
NEUTS SEG NFR BLD: 88 % (ref 32–75)
NRBC # BLD: 0 K/UL (ref 0–0.01)
NRBC BLD-RTO: 0 PER 100 WBC
NT PRO BNP: 3337 PG/ML
PCO2 BLDV: 47 MMHG (ref 41–51)
PH BLDV: 7.36 (ref 7.32–7.42)
PLATELET # BLD AUTO: 291 K/UL (ref 150–400)
PMV BLD AUTO: 10.4 FL (ref 8.9–12.9)
PO2 BLDV: 27 MMHG (ref 25–40)
POTASSIUM SERPL-SCNC: 4.3 MMOL/L (ref 3.5–5.1)
PROT SERPL-MCNC: 5.7 G/DL (ref 6.4–8.2)
RBC # BLD AUTO: 2.6 M/UL (ref 4.1–5.7)
SAO2 % BLDV: 47 % (ref 65–88)
SAO2% DEVICE SAO2% SENSOR NAME: ABNORMAL
SODIUM SERPL-SCNC: 147 MMOL/L (ref 136–145)
SPECIMEN SITE: ABNORMAL
WBC # BLD AUTO: 10.6 K/UL (ref 4.1–11.1)

## 2024-01-29 PROCEDURE — 82803 BLOOD GASES ANY COMBINATION: CPT

## 2024-01-29 PROCEDURE — 85014 HEMATOCRIT: CPT

## 2024-01-29 PROCEDURE — C9113 INJ PANTOPRAZOLE SODIUM, VIA: HCPCS | Performed by: HOSPITALIST

## 2024-01-29 PROCEDURE — 80053 COMPREHEN METABOLIC PANEL: CPT

## 2024-01-29 PROCEDURE — A4216 STERILE WATER/SALINE, 10 ML: HCPCS | Performed by: HOSPITALIST

## 2024-01-29 PROCEDURE — 2580000003 HC RX 258: Performed by: INTERNAL MEDICINE

## 2024-01-29 PROCEDURE — 1100000003 HC PRIVATE W/ TELEMETRY

## 2024-01-29 PROCEDURE — 2580000003 HC RX 258: Performed by: STUDENT IN AN ORGANIZED HEALTH CARE EDUCATION/TRAINING PROGRAM

## 2024-01-29 PROCEDURE — 36415 COLL VENOUS BLD VENIPUNCTURE: CPT

## 2024-01-29 PROCEDURE — 2700000000 HC OXYGEN THERAPY PER DAY

## 2024-01-29 PROCEDURE — 83880 ASSAY OF NATRIURETIC PEPTIDE: CPT

## 2024-01-29 PROCEDURE — 70450 CT HEAD/BRAIN W/O DYE: CPT

## 2024-01-29 PROCEDURE — 6360000002 HC RX W HCPCS: Performed by: HOSPITALIST

## 2024-01-29 PROCEDURE — 71045 X-RAY EXAM CHEST 1 VIEW: CPT

## 2024-01-29 PROCEDURE — 2580000003 HC RX 258: Performed by: HOSPITALIST

## 2024-01-29 PROCEDURE — 30233N1 TRANSFUSION OF NONAUTOLOGOUS RED BLOOD CELLS INTO PERIPHERAL VEIN, PERCUTANEOUS APPROACH: ICD-10-PCS | Performed by: HOSPITALIST

## 2024-01-29 PROCEDURE — 2500000003 HC RX 250 WO HCPCS: Performed by: INTERNAL MEDICINE

## 2024-01-29 PROCEDURE — 6370000000 HC RX 637 (ALT 250 FOR IP): Performed by: HOSPITALIST

## 2024-01-29 PROCEDURE — P9016 RBC LEUKOCYTES REDUCED: HCPCS

## 2024-01-29 PROCEDURE — 85025 COMPLETE CBC W/AUTO DIFF WBC: CPT

## 2024-01-29 PROCEDURE — 85018 HEMOGLOBIN: CPT

## 2024-01-29 PROCEDURE — 36430 TRANSFUSION BLD/BLD COMPNT: CPT

## 2024-01-29 RX ORDER — DEXTROSE MONOHYDRATE 50 MG/ML
INJECTION, SOLUTION INTRAVENOUS CONTINUOUS
Status: DISCONTINUED | OUTPATIENT
Start: 2024-01-29 | End: 2024-01-31

## 2024-01-29 RX ORDER — ASPIRIN 81 MG/1
81 TABLET ORAL DAILY
Status: DISCONTINUED | OUTPATIENT
Start: 2024-01-29 | End: 2024-02-06 | Stop reason: SDUPTHER

## 2024-01-29 RX ORDER — SODIUM CHLORIDE, SODIUM LACTATE, POTASSIUM CHLORIDE, AND CALCIUM CHLORIDE .6; .31; .03; .02 G/100ML; G/100ML; G/100ML; G/100ML
2000 INJECTION, SOLUTION INTRAVENOUS ONCE
Status: COMPLETED | OUTPATIENT
Start: 2024-01-29 | End: 2024-01-29

## 2024-01-29 RX ORDER — SODIUM CHLORIDE 9 MG/ML
INJECTION, SOLUTION INTRAVENOUS PRN
Status: DISCONTINUED | OUTPATIENT
Start: 2024-01-29 | End: 2024-01-31

## 2024-01-29 RX ADMIN — PANTOPRAZOLE SODIUM 40 MG: 40 INJECTION, POWDER, FOR SOLUTION INTRAVENOUS at 17:02

## 2024-01-29 RX ADMIN — DEXTROSE MONOHYDRATE, SODIUM CHLORIDE, AND POTASSIUM CHLORIDE: 50; 2.25; 1.49 INJECTION, SOLUTION INTRAVENOUS at 09:05

## 2024-01-29 RX ADMIN — SODIUM CHLORIDE 2.5 MG/HR: 900 INJECTION, SOLUTION INTRAVENOUS at 11:21

## 2024-01-29 RX ADMIN — DEXTROSE MONOHYDRATE: 50 INJECTION, SOLUTION INTRAVENOUS at 11:29

## 2024-01-29 RX ADMIN — PANTOPRAZOLE SODIUM 40 MG: 40 INJECTION, POWDER, FOR SOLUTION INTRAVENOUS at 06:48

## 2024-01-29 RX ADMIN — SODIUM CHLORIDE, POTASSIUM CHLORIDE, SODIUM LACTATE AND CALCIUM CHLORIDE 1000 ML: 600; 310; 30; 20 INJECTION, SOLUTION INTRAVENOUS at 16:30

## 2024-01-29 RX ADMIN — SODIUM CHLORIDE, PRESERVATIVE FREE 10 ML: 5 INJECTION INTRAVENOUS at 08:54

## 2024-01-29 RX ADMIN — Medication: at 08:54

## 2024-01-30 ENCOUNTER — APPOINTMENT (OUTPATIENT)
Facility: HOSPITAL | Age: 84
DRG: 377 | End: 2024-01-30
Attending: INTERNAL MEDICINE
Payer: MEDICARE

## 2024-01-30 LAB
ABO + RH BLD: NORMAL
ALBUMIN SERPL-MCNC: 1.9 G/DL (ref 3.5–5)
ALBUMIN/GLOB SERPL: 0.5 (ref 1.1–2.2)
ALP SERPL-CCNC: 122 U/L (ref 45–117)
ALT SERPL-CCNC: 11 U/L (ref 12–78)
ANION GAP SERPL CALC-SCNC: 2 MMOL/L (ref 5–15)
ANION GAP SERPL CALC-SCNC: 4 MMOL/L (ref 5–15)
AST SERPL-CCNC: 13 U/L (ref 15–37)
BASOPHILS # BLD: 0 K/UL (ref 0–0.1)
BASOPHILS NFR BLD: 0 % (ref 0–1)
BILIRUB SERPL-MCNC: 0.8 MG/DL (ref 0.2–1)
BLD PROD TYP BPU: NORMAL
BLOOD BANK DISPENSE STATUS: NORMAL
BLOOD GROUP ANTIBODIES SERPL: NORMAL
BPU ID: NORMAL
BUN SERPL-MCNC: 24 MG/DL (ref 6–20)
BUN SERPL-MCNC: 26 MG/DL (ref 6–20)
BUN/CREAT SERPL: 20 (ref 12–20)
BUN/CREAT SERPL: 20 (ref 12–20)
CALCIUM SERPL-MCNC: 10.8 MG/DL (ref 8.5–10.1)
CALCIUM SERPL-MCNC: 10.9 MG/DL (ref 8.5–10.1)
CHLORIDE SERPL-SCNC: 113 MMOL/L (ref 97–108)
CHLORIDE SERPL-SCNC: 114 MMOL/L (ref 97–108)
CO2 SERPL-SCNC: 27 MMOL/L (ref 21–32)
CO2 SERPL-SCNC: 30 MMOL/L (ref 21–32)
CREAT SERPL-MCNC: 1.19 MG/DL (ref 0.7–1.3)
CREAT SERPL-MCNC: 1.28 MG/DL (ref 0.7–1.3)
CROSSMATCH RESULT: NORMAL
DIFFERENTIAL METHOD BLD: ABNORMAL
ECHO AO ROOT DIAM: 3.5 CM
ECHO AO ROOT INDEX: 1.83 CM/M2
ECHO AV AREA PLAN/BSA: 1.2 CM2/M2
ECHO AV AREA PLAN: 2.3 CM2
ECHO AV MEAN GRADIENT: 8 MMHG
ECHO AV MEAN VELOCITY: 1.4 M/S
ECHO AV PEAK GRADIENT: 12 MMHG
ECHO AV PEAK GRADIENT: 12 MMHG
ECHO AV PEAK VELOCITY: 1.8 M/S
ECHO AV PEAK VELOCITY: 1.8 M/S
ECHO AV VTI: 33.3 CM
ECHO BSA: 1.9 M2
ECHO LV EDV A2C: 154 ML
ECHO LV EDV A4C: 229 ML
ECHO LV EDV BP: 198 ML (ref 67–155)
ECHO LV EDV INDEX A4C: 120 ML/M2
ECHO LV EDV INDEX BP: 104 ML/M2
ECHO LV EDV NDEX A2C: 81 ML/M2
ECHO LV EJECTION FRACTION A2C: 46 %
ECHO LV EJECTION FRACTION A4C: 49 %
ECHO LV EJECTION FRACTION BIPLANE: 48 % (ref 55–100)
ECHO LV ESV A2C: 83 ML
ECHO LV ESV A4C: 116 ML
ECHO LV ESV BP: 102 ML (ref 22–58)
ECHO LV ESV INDEX A2C: 43 ML/M2
ECHO LV ESV INDEX A4C: 61 ML/M2
ECHO LV ESV INDEX BP: 53 ML/M2
ECHO LVOT AV VTI INDEX: 0.65
ECHO LVOT MEAN GRADIENT: 3 MMHG
ECHO LVOT PEAK GRADIENT: 6 MMHG
ECHO LVOT PEAK GRADIENT: 6 MMHG
ECHO LVOT PEAK VELOCITY: 1.3 M/S
ECHO LVOT PEAK VELOCITY: 1.3 M/S
ECHO LVOT VTI: 21.6 CM
ECHO MV A VELOCITY: 1.01 M/S
ECHO MV E DECELERATION TIME (DT): 185.5 MS
ECHO MV E VELOCITY: 0.65 M/S
ECHO MV E/A RATIO: 0.64
ECHO MV LVOT VTI INDEX: 1.39
ECHO MV MAX VELOCITY: 1.2 M/S
ECHO MV MEAN GRADIENT: 2 MMHG
ECHO MV MEAN VELOCITY: 0.6 M/S
ECHO MV PEAK GRADIENT: 6 MMHG
ECHO MV VTI: 30 CM
ECHO TV REGURGITANT MAX VELOCITY: 3.41 M/S
ECHO TV REGURGITANT PEAK GRADIENT: 47 MMHG
EOSINOPHIL # BLD: 0.1 K/UL (ref 0–0.4)
EOSINOPHIL NFR BLD: 1 % (ref 0–7)
ERYTHROCYTE [DISTWIDTH] IN BLOOD BY AUTOMATED COUNT: 16.7 % (ref 11.5–14.5)
GLOBULIN SER CALC-MCNC: 4 G/DL (ref 2–4)
GLUCOSE SERPL-MCNC: 121 MG/DL (ref 65–100)
GLUCOSE SERPL-MCNC: 145 MG/DL (ref 65–100)
HCT VFR BLD AUTO: 27.8 % (ref 36.6–50.3)
HCT VFR BLD AUTO: 28.9 % (ref 36.6–50.3)
HCT VFR BLD AUTO: 30.9 % (ref 36.6–50.3)
HGB BLD-MCNC: 7.9 G/DL (ref 12.1–17)
HGB BLD-MCNC: 8.5 G/DL (ref 12.1–17)
HGB BLD-MCNC: 9.4 G/DL (ref 12.1–17)
IMM GRANULOCYTES # BLD AUTO: 0.1 K/UL (ref 0–0.04)
IMM GRANULOCYTES NFR BLD AUTO: 1 % (ref 0–0.5)
LYMPHOCYTES # BLD: 0.6 K/UL (ref 0.8–3.5)
LYMPHOCYTES NFR BLD: 5 % (ref 12–49)
MCH RBC QN AUTO: 25.9 PG (ref 26–34)
MCHC RBC AUTO-ENTMCNC: 28.4 G/DL (ref 30–36.5)
MCV RBC AUTO: 91.1 FL (ref 80–99)
MONOCYTES # BLD: 0.8 K/UL (ref 0–1)
MONOCYTES NFR BLD: 6 % (ref 5–13)
NEUTS SEG # BLD: 11 K/UL (ref 1.8–8)
NEUTS SEG NFR BLD: 87 % (ref 32–75)
NRBC # BLD: 0 K/UL (ref 0–0.01)
NRBC BLD-RTO: 0 PER 100 WBC
PLATELET # BLD AUTO: 266 K/UL (ref 150–400)
PMV BLD AUTO: 10.7 FL (ref 8.9–12.9)
POTASSIUM SERPL-SCNC: 3.9 MMOL/L (ref 3.5–5.1)
POTASSIUM SERPL-SCNC: 4.7 MMOL/L (ref 3.5–5.1)
PROT SERPL-MCNC: 5.9 G/DL (ref 6.4–8.2)
RBC # BLD AUTO: 3.05 M/UL (ref 4.1–5.7)
RBC MORPH BLD: ABNORMAL
SODIUM SERPL-SCNC: 144 MMOL/L (ref 136–145)
SODIUM SERPL-SCNC: 146 MMOL/L (ref 136–145)
SPECIMEN EXP DATE BLD: NORMAL
TROPONIN I SERPL HS-MCNC: 226 NG/L (ref 0–76)
TSH SERPL DL<=0.05 MIU/L-ACNC: 1.13 UIU/ML (ref 0.36–3.74)
UNIT DIVISION: 0
WBC # BLD AUTO: 12.6 K/UL (ref 4.1–11.1)

## 2024-01-30 PROCEDURE — 93005 ELECTROCARDIOGRAM TRACING: CPT | Performed by: INTERNAL MEDICINE

## 2024-01-30 PROCEDURE — 85025 COMPLETE CBC W/AUTO DIFF WBC: CPT

## 2024-01-30 PROCEDURE — 97166 OT EVAL MOD COMPLEX 45 MIN: CPT

## 2024-01-30 PROCEDURE — 84484 ASSAY OF TROPONIN QUANT: CPT

## 2024-01-30 PROCEDURE — C9113 INJ PANTOPRAZOLE SODIUM, VIA: HCPCS | Performed by: HOSPITALIST

## 2024-01-30 PROCEDURE — 93308 TTE F-UP OR LMTD: CPT

## 2024-01-30 PROCEDURE — 84443 ASSAY THYROID STIM HORMONE: CPT

## 2024-01-30 PROCEDURE — 97163 PT EVAL HIGH COMPLEX 45 MIN: CPT

## 2024-01-30 PROCEDURE — 6360000002 HC RX W HCPCS: Performed by: HOSPITALIST

## 2024-01-30 PROCEDURE — 80053 COMPREHEN METABOLIC PANEL: CPT

## 2024-01-30 PROCEDURE — A4216 STERILE WATER/SALINE, 10 ML: HCPCS | Performed by: HOSPITALIST

## 2024-01-30 PROCEDURE — 2060000000 HC ICU INTERMEDIATE R&B

## 2024-01-30 PROCEDURE — 6360000002 HC RX W HCPCS: Performed by: INTERNAL MEDICINE

## 2024-01-30 PROCEDURE — 97530 THERAPEUTIC ACTIVITIES: CPT

## 2024-01-30 PROCEDURE — 2580000003 HC RX 258: Performed by: NURSE PRACTITIONER

## 2024-01-30 PROCEDURE — 2580000003 HC RX 258: Performed by: HOSPITALIST

## 2024-01-30 PROCEDURE — 2580000003 HC RX 258: Performed by: INTERNAL MEDICINE

## 2024-01-30 PROCEDURE — 2700000000 HC OXYGEN THERAPY PER DAY

## 2024-01-30 PROCEDURE — 97110 THERAPEUTIC EXERCISES: CPT

## 2024-01-30 PROCEDURE — 97535 SELF CARE MNGMENT TRAINING: CPT

## 2024-01-30 RX ORDER — DIGOXIN 0.25 MG/ML
250 INJECTION INTRAMUSCULAR; INTRAVENOUS ONCE
Status: COMPLETED | OUTPATIENT
Start: 2024-01-30 | End: 2024-01-30

## 2024-01-30 RX ORDER — 0.9 % SODIUM CHLORIDE 0.9 %
250 INTRAVENOUS SOLUTION INTRAVENOUS ONCE
Status: COMPLETED | OUTPATIENT
Start: 2024-01-30 | End: 2024-01-30

## 2024-01-30 RX ORDER — SODIUM CHLORIDE, SODIUM LACTATE, POTASSIUM CHLORIDE, AND CALCIUM CHLORIDE .6; .31; .03; .02 G/100ML; G/100ML; G/100ML; G/100ML
500 INJECTION, SOLUTION INTRAVENOUS ONCE
Status: COMPLETED | OUTPATIENT
Start: 2024-01-30 | End: 2024-01-30

## 2024-01-30 RX ADMIN — SODIUM CHLORIDE 250 ML: 9 INJECTION, SOLUTION INTRAVENOUS at 17:30

## 2024-01-30 RX ADMIN — SODIUM CHLORIDE, PRESERVATIVE FREE 10 ML: 5 INJECTION INTRAVENOUS at 10:52

## 2024-01-30 RX ADMIN — PANTOPRAZOLE SODIUM 40 MG: 40 INJECTION, POWDER, FOR SOLUTION INTRAVENOUS at 05:52

## 2024-01-30 RX ADMIN — SODIUM CHLORIDE, PRESERVATIVE FREE 10 ML: 5 INJECTION INTRAVENOUS at 20:26

## 2024-01-30 RX ADMIN — DIGOXIN 250 MCG: 0.25 INJECTION INTRAMUSCULAR; INTRAVENOUS at 12:26

## 2024-01-30 RX ADMIN — SODIUM CHLORIDE, POTASSIUM CHLORIDE, SODIUM LACTATE AND CALCIUM CHLORIDE 500 ML: 600; 310; 30; 20 INJECTION, SOLUTION INTRAVENOUS at 21:32

## 2024-01-30 RX ADMIN — SODIUM CHLORIDE 250 ML: 9 INJECTION, SOLUTION INTRAVENOUS at 12:30

## 2024-01-30 RX ADMIN — PANTOPRAZOLE SODIUM 40 MG: 40 INJECTION, POWDER, FOR SOLUTION INTRAVENOUS at 17:26

## 2024-01-30 RX ADMIN — Medication: at 20:25

## 2024-01-30 RX ADMIN — AMIODARONE HYDROCHLORIDE 1 MG/MIN: 50 INJECTION, SOLUTION INTRAVENOUS at 18:55

## 2024-01-30 RX ADMIN — DIGOXIN 250 MCG: 0.25 INJECTION INTRAMUSCULAR; INTRAVENOUS at 17:52

## 2024-01-30 RX ADMIN — Medication: at 10:51

## 2024-01-30 ASSESSMENT — PAIN SCALES - WONG BAKER: WONGBAKER_NUMERICALRESPONSE: 0

## 2024-01-30 NOTE — WOUND CARE
Wound Care consult for the sacrum wounds and the left heel wound present on admission. Chart reviewed and patient assessed. Pt. Is 83 years old and he is admitted with a DNR status for upper GI bleed with anemia and Hgb of 5.9 on admission.   P. Has a small increase in WBC. Rapid Covid test is negative. Pt. Has CHF with HTN and A-fib with mild RVR. Hx of prostate cancer.   Assessment: Pt. Is asleep and did not wake completely up during this exam. Turned with nursing assistance to the left side of the patient and the sacrum / buttocks has several areas that are not blanchable and slightly open skin / flaky. Pt. Is also incontinent.   Sacrum / buttocks stage 2 PI in 4 small places:      Right heel red but blanchable:       Left heel DTI - purple: Venelex ([facility])  Ointment - apply three times per day.       Left distal arm / wrist: skin tear: cleansed  And dressed with Xeroform / ABD / Sacha:       Recommended Treatment and care done today:  all wounds examined.  Venelex is being used on the heels. Will use Desitin cream for the sacrum and buttocks due to the incontinence. Keep patient off of the mid sacrum.   Skin tear care for the left wrist every 4 days. Change the current dressing on 2-3-24. Dressing labeled as such.   Turn patient with a significant turn to the left or right to keep him off of the mid sacrum and to do continence checks / skin cleansing to keep the skin healing.   Float the heels.  Mari Ray, RN, BSN, CWON

## 2024-01-31 LAB
ALBUMIN SERPL-MCNC: 1.6 G/DL (ref 3.5–5)
ALBUMIN/GLOB SERPL: 0.4 (ref 1.1–2.2)
ALP SERPL-CCNC: 104 U/L (ref 45–117)
ALT SERPL-CCNC: 9 U/L (ref 12–78)
ANION GAP SERPL CALC-SCNC: 2 MMOL/L (ref 5–15)
AST SERPL-CCNC: 12 U/L (ref 15–37)
BASOPHILS # BLD: 0 K/UL (ref 0–0.1)
BASOPHILS NFR BLD: 0 % (ref 0–1)
BILIRUB SERPL-MCNC: 0.7 MG/DL (ref 0.2–1)
BUN SERPL-MCNC: 25 MG/DL (ref 6–20)
BUN/CREAT SERPL: 22 (ref 12–20)
CALCIUM SERPL-MCNC: 10.6 MG/DL (ref 8.5–10.1)
CALCIUM SERPL-MCNC: 10.7 MG/DL (ref 8.5–10.1)
CHLORIDE SERPL-SCNC: 115 MMOL/L (ref 97–108)
CO2 SERPL-SCNC: 27 MMOL/L (ref 21–32)
CORTIS AM PEAK SERPL-MCNC: 22.1 UG/DL (ref 4.3–22.45)
CREAT SERPL-MCNC: 1.16 MG/DL (ref 0.7–1.3)
DIFFERENTIAL METHOD BLD: ABNORMAL
EKG ATRIAL RATE: 153 BPM
EKG DIAGNOSIS: NORMAL
EKG Q-T INTERVAL: 298 MS
EKG QRS DURATION: 112 MS
EKG QTC CALCULATION (BAZETT): 467 MS
EKG R AXIS: 13 DEGREES
EKG T AXIS: 253 DEGREES
EKG VENTRICULAR RATE: 148 BPM
EOSINOPHIL # BLD: 0.1 K/UL (ref 0–0.4)
EOSINOPHIL NFR BLD: 1 % (ref 0–7)
ERYTHROCYTE [DISTWIDTH] IN BLOOD BY AUTOMATED COUNT: 16.2 % (ref 11.5–14.5)
GLOBULIN SER CALC-MCNC: 3.6 G/DL (ref 2–4)
GLUCOSE SERPL-MCNC: 123 MG/DL (ref 65–100)
HCT VFR BLD AUTO: 24.6 % (ref 36.6–50.3)
HCT VFR BLD AUTO: 26.9 % (ref 36.6–50.3)
HGB BLD-MCNC: 7.3 G/DL (ref 12.1–17)
HGB BLD-MCNC: 7.8 G/DL (ref 12.1–17)
IMM GRANULOCYTES # BLD AUTO: 0.1 K/UL (ref 0–0.04)
IMM GRANULOCYTES NFR BLD AUTO: 1 % (ref 0–0.5)
LYMPHOCYTES # BLD: 0.6 K/UL (ref 0.8–3.5)
LYMPHOCYTES NFR BLD: 6 % (ref 12–49)
MCH RBC QN AUTO: 26.3 PG (ref 26–34)
MCHC RBC AUTO-ENTMCNC: 29.7 G/DL (ref 30–36.5)
MCV RBC AUTO: 88.5 FL (ref 80–99)
MONOCYTES # BLD: 0.7 K/UL (ref 0–1)
MONOCYTES NFR BLD: 6 % (ref 5–13)
NEUTS SEG # BLD: 9.3 K/UL (ref 1.8–8)
NEUTS SEG NFR BLD: 86 % (ref 32–75)
NRBC # BLD: 0 K/UL (ref 0–0.01)
NRBC BLD-RTO: 0 PER 100 WBC
PLATELET # BLD AUTO: 241 K/UL (ref 150–400)
PMV BLD AUTO: 10.3 FL (ref 8.9–12.9)
POTASSIUM SERPL-SCNC: 3.8 MMOL/L (ref 3.5–5.1)
PROCALCITONIN SERPL-MCNC: 0.25 NG/ML
PROT SERPL-MCNC: 5.2 G/DL (ref 6.4–8.2)
PSA SERPL-MCNC: 0.2 NG/ML (ref 0.01–4)
PTH-INTACT SERPL-MCNC: 14.9 PG/ML (ref 18.4–88)
RBC # BLD AUTO: 2.78 M/UL (ref 4.1–5.7)
SODIUM SERPL-SCNC: 144 MMOL/L (ref 136–145)
TROPONIN I SERPL HS-MCNC: 293 NG/L (ref 0–76)
WBC # BLD AUTO: 10.8 K/UL (ref 4.1–11.1)

## 2024-01-31 PROCEDURE — 85025 COMPLETE CBC W/AUTO DIFF WBC: CPT

## 2024-01-31 PROCEDURE — 2060000000 HC ICU INTERMEDIATE R&B

## 2024-01-31 PROCEDURE — 6370000000 HC RX 637 (ALT 250 FOR IP): Performed by: INTERNAL MEDICINE

## 2024-01-31 PROCEDURE — C9113 INJ PANTOPRAZOLE SODIUM, VIA: HCPCS | Performed by: HOSPITALIST

## 2024-01-31 PROCEDURE — 85018 HEMOGLOBIN: CPT

## 2024-01-31 PROCEDURE — 84145 PROCALCITONIN (PCT): CPT

## 2024-01-31 PROCEDURE — 2580000003 HC RX 258: Performed by: INTERNAL MEDICINE

## 2024-01-31 PROCEDURE — 36415 COLL VENOUS BLD VENIPUNCTURE: CPT

## 2024-01-31 PROCEDURE — G0103 PSA SCREENING: HCPCS

## 2024-01-31 PROCEDURE — 83970 ASSAY OF PARATHORMONE: CPT

## 2024-01-31 PROCEDURE — 84484 ASSAY OF TROPONIN QUANT: CPT

## 2024-01-31 PROCEDURE — 6360000002 HC RX W HCPCS: Performed by: INTERNAL MEDICINE

## 2024-01-31 PROCEDURE — 80053 COMPREHEN METABOLIC PANEL: CPT

## 2024-01-31 PROCEDURE — 6360000002 HC RX W HCPCS: Performed by: HOSPITALIST

## 2024-01-31 PROCEDURE — A4216 STERILE WATER/SALINE, 10 ML: HCPCS | Performed by: HOSPITALIST

## 2024-01-31 PROCEDURE — 2700000000 HC OXYGEN THERAPY PER DAY

## 2024-01-31 PROCEDURE — 85014 HEMATOCRIT: CPT

## 2024-01-31 PROCEDURE — 6370000000 HC RX 637 (ALT 250 FOR IP): Performed by: STUDENT IN AN ORGANIZED HEALTH CARE EDUCATION/TRAINING PROGRAM

## 2024-01-31 PROCEDURE — 6370000000 HC RX 637 (ALT 250 FOR IP): Performed by: HOSPITALIST

## 2024-01-31 PROCEDURE — 82533 TOTAL CORTISOL: CPT

## 2024-01-31 PROCEDURE — 2580000003 HC RX 258: Performed by: HOSPITALIST

## 2024-01-31 PROCEDURE — 84403 ASSAY OF TOTAL TESTOSTERONE: CPT

## 2024-01-31 PROCEDURE — 76937 US GUIDE VASCULAR ACCESS: CPT

## 2024-01-31 RX ORDER — AMIODARONE HYDROCHLORIDE 200 MG/1
200 TABLET ORAL 2 TIMES DAILY
Status: DISCONTINUED | OUTPATIENT
Start: 2024-01-31 | End: 2024-02-07 | Stop reason: HOSPADM

## 2024-01-31 RX ADMIN — ASPIRIN 81 MG: 81 TABLET, COATED ORAL at 09:20

## 2024-01-31 RX ADMIN — AMIODARONE HYDROCHLORIDE 200 MG: 200 TABLET ORAL at 21:55

## 2024-01-31 RX ADMIN — PANTOPRAZOLE SODIUM 40 MG: 40 INJECTION, POWDER, FOR SOLUTION INTRAVENOUS at 16:30

## 2024-01-31 RX ADMIN — SODIUM CHLORIDE, PRESERVATIVE FREE 10 ML: 5 INJECTION INTRAVENOUS at 21:47

## 2024-01-31 RX ADMIN — FINASTERIDE 5 MG: 5 TABLET, FILM COATED ORAL at 09:20

## 2024-01-31 RX ADMIN — Medication: at 09:26

## 2024-01-31 RX ADMIN — Medication: at 21:46

## 2024-01-31 RX ADMIN — Medication: at 09:25

## 2024-01-31 RX ADMIN — Medication: at 16:13

## 2024-01-31 RX ADMIN — PANTOPRAZOLE SODIUM 40 MG: 40 INJECTION, POWDER, FOR SOLUTION INTRAVENOUS at 06:33

## 2024-01-31 RX ADMIN — TAMSULOSIN HYDROCHLORIDE 0.4 MG: 0.4 CAPSULE ORAL at 09:20

## 2024-01-31 RX ADMIN — ATORVASTATIN CALCIUM 40 MG: 40 TABLET, FILM COATED ORAL at 09:20

## 2024-01-31 RX ADMIN — SODIUM CHLORIDE, PRESERVATIVE FREE 10 ML: 5 INJECTION INTRAVENOUS at 09:26

## 2024-01-31 RX ADMIN — TAMSULOSIN HYDROCHLORIDE 0.4 MG: 0.4 CAPSULE ORAL at 21:45

## 2024-01-31 RX ADMIN — AMIODARONE HYDROCHLORIDE 0.5 MG/MIN: 50 INJECTION, SOLUTION INTRAVENOUS at 09:14

## 2024-02-01 PROCEDURE — 6360000002 HC RX W HCPCS: Performed by: HOSPITALIST

## 2024-02-01 PROCEDURE — 2700000000 HC OXYGEN THERAPY PER DAY

## 2024-02-01 PROCEDURE — 6370000000 HC RX 637 (ALT 250 FOR IP): Performed by: STUDENT IN AN ORGANIZED HEALTH CARE EDUCATION/TRAINING PROGRAM

## 2024-02-01 PROCEDURE — 6370000000 HC RX 637 (ALT 250 FOR IP): Performed by: HOSPITALIST

## 2024-02-01 PROCEDURE — C9113 INJ PANTOPRAZOLE SODIUM, VIA: HCPCS | Performed by: HOSPITALIST

## 2024-02-01 PROCEDURE — 6370000000 HC RX 637 (ALT 250 FOR IP): Performed by: INTERNAL MEDICINE

## 2024-02-01 PROCEDURE — 2580000003 HC RX 258: Performed by: HOSPITALIST

## 2024-02-01 PROCEDURE — A4216 STERILE WATER/SALINE, 10 ML: HCPCS | Performed by: HOSPITALIST

## 2024-02-01 PROCEDURE — 2060000000 HC ICU INTERMEDIATE R&B

## 2024-02-01 RX ADMIN — Medication: at 20:27

## 2024-02-01 RX ADMIN — Medication: at 20:28

## 2024-02-01 RX ADMIN — TAMSULOSIN HYDROCHLORIDE 0.4 MG: 0.4 CAPSULE ORAL at 09:44

## 2024-02-01 RX ADMIN — FINASTERIDE 5 MG: 5 TABLET, FILM COATED ORAL at 09:44

## 2024-02-01 RX ADMIN — TAMSULOSIN HYDROCHLORIDE 0.4 MG: 0.4 CAPSULE ORAL at 20:28

## 2024-02-01 RX ADMIN — Medication: at 17:27

## 2024-02-01 RX ADMIN — ASPIRIN 81 MG: 81 TABLET, COATED ORAL at 09:45

## 2024-02-01 RX ADMIN — SODIUM CHLORIDE, PRESERVATIVE FREE 10 ML: 5 INJECTION INTRAVENOUS at 09:46

## 2024-02-01 RX ADMIN — SODIUM CHLORIDE, PRESERVATIVE FREE 10 ML: 5 INJECTION INTRAVENOUS at 20:34

## 2024-02-01 RX ADMIN — Medication: at 09:45

## 2024-02-01 RX ADMIN — AMIODARONE HYDROCHLORIDE 200 MG: 200 TABLET ORAL at 09:44

## 2024-02-01 RX ADMIN — Medication: at 09:47

## 2024-02-01 RX ADMIN — PANTOPRAZOLE SODIUM 40 MG: 40 INJECTION, POWDER, FOR SOLUTION INTRAVENOUS at 17:35

## 2024-02-01 RX ADMIN — PANTOPRAZOLE SODIUM 40 MG: 40 INJECTION, POWDER, FOR SOLUTION INTRAVENOUS at 05:25

## 2024-02-01 RX ADMIN — ACETAMINOPHEN 650 MG: 325 TABLET ORAL at 18:42

## 2024-02-01 RX ADMIN — ATORVASTATIN CALCIUM 40 MG: 40 TABLET, FILM COATED ORAL at 09:44

## 2024-02-01 RX ADMIN — AMIODARONE HYDROCHLORIDE 200 MG: 200 TABLET ORAL at 20:28

## 2024-02-01 ASSESSMENT — PAIN SCALES - GENERAL: PAINLEVEL_OUTOF10: 5

## 2024-02-01 ASSESSMENT — PAIN DESCRIPTION - LOCATION: LOCATION: LEG

## 2024-02-01 ASSESSMENT — PAIN SCALES - WONG BAKER
WONGBAKER_NUMERICALRESPONSE: 0

## 2024-02-01 ASSESSMENT — PAIN DESCRIPTION - ORIENTATION: ORIENTATION: LEFT

## 2024-02-02 LAB
ANION GAP SERPL CALC-SCNC: 2 MMOL/L (ref 5–15)
BACTERIA SPEC CULT: NORMAL
BASOPHILS # BLD: 0 K/UL (ref 0–0.1)
BASOPHILS NFR BLD: 0 % (ref 0–1)
BUN SERPL-MCNC: 24 MG/DL (ref 6–20)
BUN/CREAT SERPL: 19 (ref 12–20)
CALCIUM SERPL-MCNC: 11 MG/DL (ref 8.5–10.1)
CHLORIDE SERPL-SCNC: 111 MMOL/L (ref 97–108)
CO2 SERPL-SCNC: 30 MMOL/L (ref 21–32)
CREAT SERPL-MCNC: 1.26 MG/DL (ref 0.7–1.3)
DIFFERENTIAL METHOD BLD: ABNORMAL
EOSINOPHIL # BLD: 0.1 K/UL (ref 0–0.4)
EOSINOPHIL NFR BLD: 2 % (ref 0–7)
ERYTHROCYTE [DISTWIDTH] IN BLOOD BY AUTOMATED COUNT: 16.4 % (ref 11.5–14.5)
GLUCOSE BLD STRIP.AUTO-MCNC: 126 MG/DL (ref 65–117)
GLUCOSE BLD STRIP.AUTO-MCNC: 154 MG/DL (ref 65–117)
GLUCOSE SERPL-MCNC: 128 MG/DL (ref 65–100)
HCT VFR BLD AUTO: 28.3 % (ref 36.6–50.3)
HGB BLD-MCNC: 8.1 G/DL (ref 12.1–17)
IMM GRANULOCYTES # BLD AUTO: 0 K/UL (ref 0–0.04)
IMM GRANULOCYTES NFR BLD AUTO: 0 % (ref 0–0.5)
LYMPHOCYTES # BLD: 0.6 K/UL (ref 0.8–3.5)
LYMPHOCYTES NFR BLD: 9 % (ref 12–49)
Lab: NORMAL
Lab: NORMAL
MCH RBC QN AUTO: 25.7 PG (ref 26–34)
MCHC RBC AUTO-ENTMCNC: 28.6 G/DL (ref 30–36.5)
MCV RBC AUTO: 89.8 FL (ref 80–99)
MONOCYTES # BLD: 0.5 K/UL (ref 0–1)
MONOCYTES NFR BLD: 7 % (ref 5–13)
NEUTS SEG # BLD: 5.6 K/UL (ref 1.8–8)
NEUTS SEG NFR BLD: 82 % (ref 32–75)
NRBC # BLD: 0 K/UL (ref 0–0.01)
NRBC BLD-RTO: 0 PER 100 WBC
PLATELET # BLD AUTO: 265 K/UL (ref 150–400)
PMV BLD AUTO: 11.1 FL (ref 8.9–12.9)
POTASSIUM SERPL-SCNC: 3.5 MMOL/L (ref 3.5–5.1)
RBC # BLD AUTO: 3.15 M/UL (ref 4.1–5.7)
RBC MORPH BLD: ABNORMAL
SERVICE CMNT-IMP: ABNORMAL
SERVICE CMNT-IMP: ABNORMAL
SERVICE CMNT-IMP: NORMAL
SERVICE CMNT-IMP: NORMAL
SODIUM SERPL-SCNC: 143 MMOL/L (ref 136–145)
TESTOST SERPL-MCNC: 19 NG/DL (ref 264–916)
WBC # BLD AUTO: 6.8 K/UL (ref 4.1–11.1)

## 2024-02-02 PROCEDURE — A4216 STERILE WATER/SALINE, 10 ML: HCPCS | Performed by: HOSPITALIST

## 2024-02-02 PROCEDURE — 6360000002 HC RX W HCPCS: Performed by: HOSPITALIST

## 2024-02-02 PROCEDURE — 97530 THERAPEUTIC ACTIVITIES: CPT

## 2024-02-02 PROCEDURE — 6370000000 HC RX 637 (ALT 250 FOR IP): Performed by: INTERNAL MEDICINE

## 2024-02-02 PROCEDURE — 2580000003 HC RX 258: Performed by: HOSPITALIST

## 2024-02-02 PROCEDURE — C9113 INJ PANTOPRAZOLE SODIUM, VIA: HCPCS | Performed by: HOSPITALIST

## 2024-02-02 PROCEDURE — 6370000000 HC RX 637 (ALT 250 FOR IP): Performed by: HOSPITALIST

## 2024-02-02 PROCEDURE — 6370000000 HC RX 637 (ALT 250 FOR IP): Performed by: STUDENT IN AN ORGANIZED HEALTH CARE EDUCATION/TRAINING PROGRAM

## 2024-02-02 PROCEDURE — 2060000000 HC ICU INTERMEDIATE R&B

## 2024-02-02 PROCEDURE — 97535 SELF CARE MNGMENT TRAINING: CPT

## 2024-02-02 PROCEDURE — 2700000000 HC OXYGEN THERAPY PER DAY

## 2024-02-02 PROCEDURE — 85025 COMPLETE CBC W/AUTO DIFF WBC: CPT

## 2024-02-02 PROCEDURE — 36415 COLL VENOUS BLD VENIPUNCTURE: CPT

## 2024-02-02 PROCEDURE — 97110 THERAPEUTIC EXERCISES: CPT

## 2024-02-02 PROCEDURE — 80048 BASIC METABOLIC PNL TOTAL CA: CPT

## 2024-02-02 PROCEDURE — 2580000003 HC RX 258: Performed by: STUDENT IN AN ORGANIZED HEALTH CARE EDUCATION/TRAINING PROGRAM

## 2024-02-02 PROCEDURE — 82962 GLUCOSE BLOOD TEST: CPT

## 2024-02-02 RX ORDER — SODIUM CHLORIDE, SODIUM LACTATE, POTASSIUM CHLORIDE, CALCIUM CHLORIDE 600; 310; 30; 20 MG/100ML; MG/100ML; MG/100ML; MG/100ML
INJECTION, SOLUTION INTRAVENOUS CONTINUOUS
Status: DISCONTINUED | OUTPATIENT
Start: 2024-02-02 | End: 2024-02-07 | Stop reason: HOSPADM

## 2024-02-02 RX ADMIN — SODIUM CHLORIDE, PRESERVATIVE FREE 10 ML: 5 INJECTION INTRAVENOUS at 09:31

## 2024-02-02 RX ADMIN — Medication: at 09:41

## 2024-02-02 RX ADMIN — AMIODARONE HYDROCHLORIDE 200 MG: 200 TABLET ORAL at 09:27

## 2024-02-02 RX ADMIN — Medication: at 16:22

## 2024-02-02 RX ADMIN — PANTOPRAZOLE SODIUM 40 MG: 40 INJECTION, POWDER, FOR SOLUTION INTRAVENOUS at 05:35

## 2024-02-02 RX ADMIN — Medication: at 09:29

## 2024-02-02 RX ADMIN — SODIUM CHLORIDE, PRESERVATIVE FREE 10 ML: 5 INJECTION INTRAVENOUS at 21:40

## 2024-02-02 RX ADMIN — Medication: at 21:44

## 2024-02-02 RX ADMIN — TAMSULOSIN HYDROCHLORIDE 0.4 MG: 0.4 CAPSULE ORAL at 21:36

## 2024-02-02 RX ADMIN — AMIODARONE HYDROCHLORIDE 200 MG: 200 TABLET ORAL at 21:36

## 2024-02-02 RX ADMIN — ASPIRIN 81 MG: 81 TABLET, COATED ORAL at 09:27

## 2024-02-02 RX ADMIN — TAMSULOSIN HYDROCHLORIDE 0.4 MG: 0.4 CAPSULE ORAL at 09:27

## 2024-02-02 RX ADMIN — PANTOPRAZOLE SODIUM 40 MG: 40 INJECTION, POWDER, FOR SOLUTION INTRAVENOUS at 18:07

## 2024-02-02 RX ADMIN — FINASTERIDE 5 MG: 5 TABLET, FILM COATED ORAL at 09:27

## 2024-02-02 RX ADMIN — Medication: at 21:45

## 2024-02-02 RX ADMIN — SODIUM CHLORIDE, POTASSIUM CHLORIDE, SODIUM LACTATE AND CALCIUM CHLORIDE: 600; 310; 30; 20 INJECTION, SOLUTION INTRAVENOUS at 09:39

## 2024-02-02 RX ADMIN — ATORVASTATIN CALCIUM 40 MG: 40 TABLET, FILM COATED ORAL at 09:27

## 2024-02-03 LAB
ANION GAP SERPL CALC-SCNC: 3 MMOL/L (ref 5–15)
BASOPHILS # BLD: 0 K/UL (ref 0–0.1)
BASOPHILS NFR BLD: 0 % (ref 0–1)
BUN SERPL-MCNC: 21 MG/DL (ref 6–20)
BUN/CREAT SERPL: 17 (ref 12–20)
CALCIUM SERPL-MCNC: 11.3 MG/DL (ref 8.5–10.1)
CHLORIDE SERPL-SCNC: 111 MMOL/L (ref 97–108)
CO2 SERPL-SCNC: 31 MMOL/L (ref 21–32)
CREAT SERPL-MCNC: 1.27 MG/DL (ref 0.7–1.3)
DIFFERENTIAL METHOD BLD: ABNORMAL
EOSINOPHIL # BLD: 0.1 K/UL (ref 0–0.4)
EOSINOPHIL NFR BLD: 1 % (ref 0–7)
ERYTHROCYTE [DISTWIDTH] IN BLOOD BY AUTOMATED COUNT: 16.4 % (ref 11.5–14.5)
GLUCOSE SERPL-MCNC: 129 MG/DL (ref 65–100)
HCT VFR BLD AUTO: 29.6 % (ref 36.6–50.3)
HGB BLD-MCNC: 8.5 G/DL (ref 12.1–17)
IMM GRANULOCYTES # BLD AUTO: 0.1 K/UL (ref 0–0.04)
IMM GRANULOCYTES NFR BLD AUTO: 1 % (ref 0–0.5)
LYMPHOCYTES # BLD: 0.6 K/UL (ref 0.8–3.5)
LYMPHOCYTES NFR BLD: 8 % (ref 12–49)
MCH RBC QN AUTO: 25.4 PG (ref 26–34)
MCHC RBC AUTO-ENTMCNC: 28.7 G/DL (ref 30–36.5)
MCV RBC AUTO: 88.6 FL (ref 80–99)
MONOCYTES # BLD: 0.5 K/UL (ref 0–1)
MONOCYTES NFR BLD: 7 % (ref 5–13)
NEUTS SEG # BLD: 6.3 K/UL (ref 1.8–8)
NEUTS SEG NFR BLD: 83 % (ref 32–75)
NRBC # BLD: 0 K/UL (ref 0–0.01)
NRBC BLD-RTO: 0 PER 100 WBC
PLATELET # BLD AUTO: 317 K/UL (ref 150–400)
PMV BLD AUTO: 11.1 FL (ref 8.9–12.9)
POTASSIUM SERPL-SCNC: 3.5 MMOL/L (ref 3.5–5.1)
RBC # BLD AUTO: 3.34 M/UL (ref 4.1–5.7)
RBC MORPH BLD: ABNORMAL
RBC MORPH BLD: ABNORMAL
SODIUM SERPL-SCNC: 145 MMOL/L (ref 136–145)
WBC # BLD AUTO: 7.6 K/UL (ref 4.1–11.1)

## 2024-02-03 PROCEDURE — 2580000003 HC RX 258: Performed by: HOSPITALIST

## 2024-02-03 PROCEDURE — 36415 COLL VENOUS BLD VENIPUNCTURE: CPT

## 2024-02-03 PROCEDURE — 85025 COMPLETE CBC W/AUTO DIFF WBC: CPT

## 2024-02-03 PROCEDURE — 6370000000 HC RX 637 (ALT 250 FOR IP): Performed by: HOSPITALIST

## 2024-02-03 PROCEDURE — 2060000000 HC ICU INTERMEDIATE R&B

## 2024-02-03 PROCEDURE — 6370000000 HC RX 637 (ALT 250 FOR IP): Performed by: INTERNAL MEDICINE

## 2024-02-03 PROCEDURE — 6360000002 HC RX W HCPCS: Performed by: HOSPITALIST

## 2024-02-03 PROCEDURE — C9113 INJ PANTOPRAZOLE SODIUM, VIA: HCPCS | Performed by: HOSPITALIST

## 2024-02-03 PROCEDURE — 76937 US GUIDE VASCULAR ACCESS: CPT

## 2024-02-03 PROCEDURE — A4216 STERILE WATER/SALINE, 10 ML: HCPCS | Performed by: HOSPITALIST

## 2024-02-03 PROCEDURE — 80048 BASIC METABOLIC PNL TOTAL CA: CPT

## 2024-02-03 PROCEDURE — 2700000000 HC OXYGEN THERAPY PER DAY

## 2024-02-03 PROCEDURE — 2580000003 HC RX 258: Performed by: STUDENT IN AN ORGANIZED HEALTH CARE EDUCATION/TRAINING PROGRAM

## 2024-02-03 PROCEDURE — 6370000000 HC RX 637 (ALT 250 FOR IP): Performed by: STUDENT IN AN ORGANIZED HEALTH CARE EDUCATION/TRAINING PROGRAM

## 2024-02-03 RX ADMIN — ASPIRIN 81 MG: 81 TABLET, COATED ORAL at 10:19

## 2024-02-03 RX ADMIN — PANTOPRAZOLE SODIUM 40 MG: 40 INJECTION, POWDER, FOR SOLUTION INTRAVENOUS at 05:46

## 2024-02-03 RX ADMIN — SODIUM CHLORIDE, POTASSIUM CHLORIDE, SODIUM LACTATE AND CALCIUM CHLORIDE: 600; 310; 30; 20 INJECTION, SOLUTION INTRAVENOUS at 14:41

## 2024-02-03 RX ADMIN — FINASTERIDE 5 MG: 5 TABLET, FILM COATED ORAL at 10:19

## 2024-02-03 RX ADMIN — Medication: at 21:20

## 2024-02-03 RX ADMIN — TAMSULOSIN HYDROCHLORIDE 0.4 MG: 0.4 CAPSULE ORAL at 10:19

## 2024-02-03 RX ADMIN — Medication: at 10:19

## 2024-02-03 RX ADMIN — Medication: at 21:18

## 2024-02-03 RX ADMIN — ATORVASTATIN CALCIUM 40 MG: 40 TABLET, FILM COATED ORAL at 10:19

## 2024-02-03 RX ADMIN — PANTOPRAZOLE SODIUM 40 MG: 40 INJECTION, POWDER, FOR SOLUTION INTRAVENOUS at 18:21

## 2024-02-03 RX ADMIN — SODIUM CHLORIDE, PRESERVATIVE FREE 10 ML: 5 INJECTION INTRAVENOUS at 21:22

## 2024-02-03 RX ADMIN — AMIODARONE HYDROCHLORIDE 200 MG: 200 TABLET ORAL at 21:18

## 2024-02-03 RX ADMIN — Medication: at 14:42

## 2024-02-03 RX ADMIN — AMIODARONE HYDROCHLORIDE 200 MG: 200 TABLET ORAL at 10:19

## 2024-02-03 RX ADMIN — TAMSULOSIN HYDROCHLORIDE 0.4 MG: 0.4 CAPSULE ORAL at 21:18

## 2024-02-03 RX ADMIN — Medication: at 10:24

## 2024-02-03 ASSESSMENT — PAIN SCALES - GENERAL
PAINLEVEL_OUTOF10: 0
PAINLEVEL_OUTOF10: 0

## 2024-02-04 LAB
ANION GAP SERPL CALC-SCNC: 5 MMOL/L (ref 5–15)
BASOPHILS # BLD: 0 K/UL (ref 0–0.1)
BASOPHILS NFR BLD: 0 % (ref 0–1)
BUN SERPL-MCNC: 20 MG/DL (ref 6–20)
BUN/CREAT SERPL: 17 (ref 12–20)
CALCIUM SERPL-MCNC: 10.6 MG/DL (ref 8.5–10.1)
CHLORIDE SERPL-SCNC: 112 MMOL/L (ref 97–108)
CO2 SERPL-SCNC: 30 MMOL/L (ref 21–32)
CREAT SERPL-MCNC: 1.2 MG/DL (ref 0.7–1.3)
DIFFERENTIAL METHOD BLD: ABNORMAL
EOSINOPHIL # BLD: 0.1 K/UL (ref 0–0.4)
EOSINOPHIL NFR BLD: 1 % (ref 0–7)
ERYTHROCYTE [DISTWIDTH] IN BLOOD BY AUTOMATED COUNT: 16.5 % (ref 11.5–14.5)
GLUCOSE SERPL-MCNC: 141 MG/DL (ref 65–100)
HCT VFR BLD AUTO: 27 % (ref 36.6–50.3)
HGB BLD-MCNC: 8 G/DL (ref 12.1–17)
IMM GRANULOCYTES # BLD AUTO: 0 K/UL (ref 0–0.04)
IMM GRANULOCYTES NFR BLD AUTO: 0 % (ref 0–0.5)
LYMPHOCYTES # BLD: 0.6 K/UL (ref 0.8–3.5)
LYMPHOCYTES NFR BLD: 7 % (ref 12–49)
MCH RBC QN AUTO: 25.8 PG (ref 26–34)
MCHC RBC AUTO-ENTMCNC: 29.6 G/DL (ref 30–36.5)
MCV RBC AUTO: 87.1 FL (ref 80–99)
MONOCYTES # BLD: 0.6 K/UL (ref 0–1)
MONOCYTES NFR BLD: 7 % (ref 5–13)
NEUTS SEG # BLD: 6.6 K/UL (ref 1.8–8)
NEUTS SEG NFR BLD: 84 % (ref 32–75)
NRBC # BLD: 0 K/UL (ref 0–0.01)
NRBC BLD-RTO: 0 PER 100 WBC
PLATELET # BLD AUTO: 310 K/UL (ref 150–400)
PMV BLD AUTO: 10.8 FL (ref 8.9–12.9)
POTASSIUM SERPL-SCNC: 3.7 MMOL/L (ref 3.5–5.1)
RBC # BLD AUTO: 3.1 M/UL (ref 4.1–5.7)
SODIUM SERPL-SCNC: 147 MMOL/L (ref 136–145)
WBC # BLD AUTO: 7.8 K/UL (ref 4.1–11.1)

## 2024-02-04 PROCEDURE — 6360000002 HC RX W HCPCS: Performed by: HOSPITALIST

## 2024-02-04 PROCEDURE — 85025 COMPLETE CBC W/AUTO DIFF WBC: CPT

## 2024-02-04 PROCEDURE — C9113 INJ PANTOPRAZOLE SODIUM, VIA: HCPCS | Performed by: HOSPITALIST

## 2024-02-04 PROCEDURE — A4216 STERILE WATER/SALINE, 10 ML: HCPCS | Performed by: HOSPITALIST

## 2024-02-04 PROCEDURE — 6370000000 HC RX 637 (ALT 250 FOR IP): Performed by: STUDENT IN AN ORGANIZED HEALTH CARE EDUCATION/TRAINING PROGRAM

## 2024-02-04 PROCEDURE — 2060000000 HC ICU INTERMEDIATE R&B

## 2024-02-04 PROCEDURE — 2700000000 HC OXYGEN THERAPY PER DAY

## 2024-02-04 PROCEDURE — 80048 BASIC METABOLIC PNL TOTAL CA: CPT

## 2024-02-04 PROCEDURE — 6370000000 HC RX 637 (ALT 250 FOR IP): Performed by: HOSPITALIST

## 2024-02-04 PROCEDURE — 36415 COLL VENOUS BLD VENIPUNCTURE: CPT

## 2024-02-04 PROCEDURE — 2580000003 HC RX 258: Performed by: HOSPITALIST

## 2024-02-04 PROCEDURE — 6370000000 HC RX 637 (ALT 250 FOR IP): Performed by: INTERNAL MEDICINE

## 2024-02-04 RX ADMIN — FINASTERIDE 5 MG: 5 TABLET, FILM COATED ORAL at 08:16

## 2024-02-04 RX ADMIN — SODIUM CHLORIDE, PRESERVATIVE FREE 10 ML: 5 INJECTION INTRAVENOUS at 08:19

## 2024-02-04 RX ADMIN — Medication: at 13:31

## 2024-02-04 RX ADMIN — PANTOPRAZOLE SODIUM 40 MG: 40 INJECTION, POWDER, FOR SOLUTION INTRAVENOUS at 17:13

## 2024-02-04 RX ADMIN — AMIODARONE HYDROCHLORIDE 200 MG: 200 TABLET ORAL at 21:35

## 2024-02-04 RX ADMIN — ASPIRIN 81 MG: 81 TABLET, COATED ORAL at 08:16

## 2024-02-04 RX ADMIN — TAMSULOSIN HYDROCHLORIDE 0.4 MG: 0.4 CAPSULE ORAL at 21:35

## 2024-02-04 RX ADMIN — ATORVASTATIN CALCIUM 40 MG: 40 TABLET, FILM COATED ORAL at 08:16

## 2024-02-04 RX ADMIN — SODIUM CHLORIDE, PRESERVATIVE FREE 10 ML: 5 INJECTION INTRAVENOUS at 21:35

## 2024-02-04 RX ADMIN — Medication: at 08:16

## 2024-02-04 RX ADMIN — AMIODARONE HYDROCHLORIDE 200 MG: 200 TABLET ORAL at 08:16

## 2024-02-04 RX ADMIN — PANTOPRAZOLE SODIUM 40 MG: 40 INJECTION, POWDER, FOR SOLUTION INTRAVENOUS at 05:52

## 2024-02-04 RX ADMIN — Medication: at 08:17

## 2024-02-04 RX ADMIN — Medication: at 21:36

## 2024-02-04 RX ADMIN — TAMSULOSIN HYDROCHLORIDE 0.4 MG: 0.4 CAPSULE ORAL at 08:16

## 2024-02-04 ASSESSMENT — PAIN SCALES - GENERAL
PAINLEVEL_OUTOF10: 0
PAINLEVEL_OUTOF10: 0

## 2024-02-04 ASSESSMENT — PAIN SCALES - WONG BAKER
WONGBAKER_NUMERICALRESPONSE: 0
WONGBAKER_NUMERICALRESPONSE: 0

## 2024-02-05 PROCEDURE — 2580000003 HC RX 258: Performed by: HOSPITALIST

## 2024-02-05 PROCEDURE — 6370000000 HC RX 637 (ALT 250 FOR IP): Performed by: INTERNAL MEDICINE

## 2024-02-05 PROCEDURE — 6360000002 HC RX W HCPCS: Performed by: HOSPITALIST

## 2024-02-05 PROCEDURE — 97535 SELF CARE MNGMENT TRAINING: CPT

## 2024-02-05 PROCEDURE — A4216 STERILE WATER/SALINE, 10 ML: HCPCS | Performed by: HOSPITALIST

## 2024-02-05 PROCEDURE — 2700000000 HC OXYGEN THERAPY PER DAY

## 2024-02-05 PROCEDURE — 2060000000 HC ICU INTERMEDIATE R&B

## 2024-02-05 PROCEDURE — 6370000000 HC RX 637 (ALT 250 FOR IP): Performed by: HOSPITALIST

## 2024-02-05 PROCEDURE — 97530 THERAPEUTIC ACTIVITIES: CPT | Performed by: PHYSICAL THERAPIST

## 2024-02-05 PROCEDURE — C9113 INJ PANTOPRAZOLE SODIUM, VIA: HCPCS | Performed by: HOSPITALIST

## 2024-02-05 PROCEDURE — 6370000000 HC RX 637 (ALT 250 FOR IP): Performed by: STUDENT IN AN ORGANIZED HEALTH CARE EDUCATION/TRAINING PROGRAM

## 2024-02-05 RX ADMIN — Medication: at 09:14

## 2024-02-05 RX ADMIN — TAMSULOSIN HYDROCHLORIDE 0.4 MG: 0.4 CAPSULE ORAL at 19:53

## 2024-02-05 RX ADMIN — Medication: at 15:36

## 2024-02-05 RX ADMIN — SODIUM CHLORIDE, PRESERVATIVE FREE 10 ML: 5 INJECTION INTRAVENOUS at 09:07

## 2024-02-05 RX ADMIN — AMIODARONE HYDROCHLORIDE 200 MG: 200 TABLET ORAL at 19:53

## 2024-02-05 RX ADMIN — Medication: at 09:06

## 2024-02-05 RX ADMIN — Medication: at 19:51

## 2024-02-05 RX ADMIN — SODIUM CHLORIDE, PRESERVATIVE FREE 10 ML: 5 INJECTION INTRAVENOUS at 19:52

## 2024-02-05 RX ADMIN — Medication: at 19:52

## 2024-02-05 RX ADMIN — PANTOPRAZOLE SODIUM 40 MG: 40 INJECTION, POWDER, FOR SOLUTION INTRAVENOUS at 06:42

## 2024-02-05 RX ADMIN — PANTOPRAZOLE SODIUM 40 MG: 40 INJECTION, POWDER, FOR SOLUTION INTRAVENOUS at 17:22

## 2024-02-05 ASSESSMENT — PAIN SCALES - GENERAL
PAINLEVEL_OUTOF10: 0

## 2024-02-05 ASSESSMENT — PAIN SCALES - WONG BAKER
WONGBAKER_NUMERICALRESPONSE: 0

## 2024-02-06 VITALS
BODY MASS INDEX: 21.75 KG/M2 | HEART RATE: 93 BPM | WEIGHT: 151.9 LBS | OXYGEN SATURATION: 95 % | HEIGHT: 70 IN | RESPIRATION RATE: 15 BRPM | TEMPERATURE: 97.5 F | SYSTOLIC BLOOD PRESSURE: 120 MMHG | DIASTOLIC BLOOD PRESSURE: 70 MMHG

## 2024-02-06 PROCEDURE — 2700000000 HC OXYGEN THERAPY PER DAY

## 2024-02-06 PROCEDURE — C9113 INJ PANTOPRAZOLE SODIUM, VIA: HCPCS | Performed by: HOSPITALIST

## 2024-02-06 PROCEDURE — 6370000000 HC RX 637 (ALT 250 FOR IP): Performed by: STUDENT IN AN ORGANIZED HEALTH CARE EDUCATION/TRAINING PROGRAM

## 2024-02-06 PROCEDURE — 6370000000 HC RX 637 (ALT 250 FOR IP): Performed by: INTERNAL MEDICINE

## 2024-02-06 PROCEDURE — 6370000000 HC RX 637 (ALT 250 FOR IP): Performed by: HOSPITALIST

## 2024-02-06 PROCEDURE — 2580000003 HC RX 258: Performed by: HOSPITALIST

## 2024-02-06 PROCEDURE — A4216 STERILE WATER/SALINE, 10 ML: HCPCS | Performed by: HOSPITALIST

## 2024-02-06 PROCEDURE — 6360000002 HC RX W HCPCS: Performed by: HOSPITALIST

## 2024-02-06 PROCEDURE — 92610 EVALUATE SWALLOWING FUNCTION: CPT

## 2024-02-06 RX ORDER — ASPIRIN 81 MG/1
81 TABLET, CHEWABLE ORAL DAILY
Qty: 30 TABLET | Refills: 3 | Status: SHIPPED | OUTPATIENT
Start: 2024-02-07

## 2024-02-06 RX ORDER — PANTOPRAZOLE SODIUM 40 MG/1
40 TABLET, DELAYED RELEASE ORAL 2 TIMES DAILY
Qty: 60 TABLET | Refills: 1 | Status: SHIPPED | OUTPATIENT
Start: 2024-02-06

## 2024-02-06 RX ORDER — ASPIRIN 81 MG/1
81 TABLET, CHEWABLE ORAL DAILY
Status: DISCONTINUED | OUTPATIENT
Start: 2024-02-07 | End: 2024-02-07 | Stop reason: HOSPADM

## 2024-02-06 RX ORDER — AMIODARONE HYDROCHLORIDE 200 MG/1
200 TABLET ORAL DAILY
Qty: 30 TABLET | Refills: 1 | Status: SHIPPED | OUTPATIENT
Start: 2024-02-06

## 2024-02-06 RX ADMIN — AMIODARONE HYDROCHLORIDE 200 MG: 200 TABLET ORAL at 09:32

## 2024-02-06 RX ADMIN — FINASTERIDE 5 MG: 5 TABLET, FILM COATED ORAL at 10:27

## 2024-02-06 RX ADMIN — TAMSULOSIN HYDROCHLORIDE 0.4 MG: 0.4 CAPSULE ORAL at 10:31

## 2024-02-06 RX ADMIN — TAMSULOSIN HYDROCHLORIDE 0.4 MG: 0.4 CAPSULE ORAL at 19:42

## 2024-02-06 RX ADMIN — Medication: at 15:56

## 2024-02-06 RX ADMIN — Medication: at 09:31

## 2024-02-06 RX ADMIN — ATORVASTATIN CALCIUM 40 MG: 40 TABLET, FILM COATED ORAL at 10:35

## 2024-02-06 RX ADMIN — SODIUM CHLORIDE, PRESERVATIVE FREE 10 ML: 5 INJECTION INTRAVENOUS at 09:30

## 2024-02-06 RX ADMIN — Medication: at 19:46

## 2024-02-06 RX ADMIN — ASPIRIN 81 MG: 81 TABLET, COATED ORAL at 10:26

## 2024-02-06 RX ADMIN — AMIODARONE HYDROCHLORIDE 200 MG: 200 TABLET ORAL at 19:42

## 2024-02-06 RX ADMIN — Medication: at 10:30

## 2024-02-06 RX ADMIN — PANTOPRAZOLE SODIUM 40 MG: 40 INJECTION, POWDER, FOR SOLUTION INTRAVENOUS at 05:58

## 2024-02-06 RX ADMIN — SODIUM CHLORIDE, PRESERVATIVE FREE 10 ML: 5 INJECTION INTRAVENOUS at 19:43

## 2024-02-06 ASSESSMENT — PAIN SCALES - GENERAL
PAINLEVEL_OUTOF10: 0

## 2024-02-06 ASSESSMENT — PAIN SCALES - WONG BAKER
WONGBAKER_NUMERICALRESPONSE: 0

## 2024-02-06 NOTE — CONSULTS
Palliative Medicine  Patient Name: Quinn Amador  YOB: 1940  MRN: [id number]  Age: 83 y.o.  Gender: male    Date of Initial Consult: 2/5/24  Date of Service: 2/5/2024  Time: 3:52 PM  Provider: YVONNE Mathur NP  Hospital Day: 10  Admit Date: 1/27/2024  Referring Provider: Jocelin Lunsford MD     NON BILLING NOTE    I ws asked to weigh in on Mr Amador- he is quite sleepy at the time of my visit and it took quite a bit of encouragement to keep him engaged, but he fell back asleep despite this after only a few minutes.  He tells me he is hungry but couldn't stay awake long enough.     Agree that hospice is very appropriate- I am not sure that SNF is a great idea especially in his current condition.  Given that he has not yet been accepted, and has no payer source for LTC, recommend that we just watch and wait.  Hospice is following peripherally her, and he is high risk for decompensation    If he remains lethargic tomorrow, unable to adequately take in PO- I can ask his son if he wants to consider comfort care.  Unfortunately this is still not a long term solution.  If he is more awake and interactive (this reportedly comes and goes), and his son wants to try skilled care- would recommend that hospice keep him on their radar as I suspect he will decompensate again in the near future.     Would recommend he be screened for Medicaid if this has not been done yet, as he will still need a long term solution after his skilled stay    YVONNE Mathur NP      
Palliative Medicine  Patient Name: Quinn Amador  YOB: 1940  MRN: [id number]  Age: 83 y.o.  Gender: male      Mr Amador is more awake, able to take med's this morning.  Also noted he was more awake for PT after I saw him yesterday.    Nursing shared that he has a wet cough after swallowing- they are recommended speech to evaluate him for appropriate diet, which is not a bad idea, unless the SNF has speech therapy there that can evaluate him    Otherwise- I still think comfort care is appropriate but I understand that at the moment there is not payor source for LTC and at this time he does not qualify for inpatient hospice    Continue to recommend that a long term solution be explored, as there is no plan for after his skilled stay if his son cannot care for him even with hospice support at home    Jessica Oreilly, YVONNE - NP        
disintegrating tablet 4 mg  4 mg Oral Q8H PRN    Or    ondansetron (ZOFRAN) injection 4 mg  4 mg IntraVENous Q6H PRN    polyethylene glycol (GLYCOLAX) packet 17 g  17 g Oral Daily PRN    acetaminophen (TYLENOL) tablet 650 mg  650 mg Oral Q6H PRN    Or    acetaminophen (TYLENOL) suppository 650 mg  650 mg Rectal Q6H PRN    pantoprazole (PROTONIX) 40 mg in sodium chloride (PF) 0.9 % 10 mL injection  40 mg IntraVENous Q12H    atorvastatin (LIPITOR) tablet 40 mg  40 mg Oral Daily    Enzalutamide TABS 160 mg (Patient Supplied)  160 mg Oral Daily    finasteride (PROSCAR) tablet 5 mg  5 mg Oral Daily    tamsulosin (FLOMAX) capsule 0.4 mg  0.4 mg Oral BID    zolpidem (AMBIEN) tablet 5 mg  5 mg Oral Nightly PRN    [Held by provider] metoprolol succinate (TOPROL XL) extended release tablet 50 mg  50 mg Oral Daily    balsum peru-castor oil (VENELEX) ointment   Topical BID        Cardiovascular ROS: Unable to complete        Objective:      Vitals:    01/29/24 1500   BP:    Pulse:    Resp:    Temp: 98.9 °F (37.2 °C)   SpO2:        Physical Exam  Constitutional:       General: He is not in acute distress.  HENT:      Head: Normocephalic and atraumatic.   Cardiovascular:      Rate and Rhythm: Regular rhythm. Tachycardia present.   Pulmonary:      Effort: Pulmonary effort is normal.      Breath sounds: No wheezing.   Musculoskeletal:      Right lower leg: No edema.      Left lower leg: No edema.   Skin:     General: Skin is warm.   Neurological:      Mental Status: He is alert.          Data Review:   Recent Labs     01/28/24  0339 01/28/24 1727 01/29/24  0544   WBC 12.2*  --  10.6   HGB 7.0* 7.2* 6.9*   HCT 24.4* 25.8* 24.5*     --  291     Recent Labs     01/27/24  0105 01/27/24  0743 01/28/24  0339 01/28/24  1727 01/29/24  0544   NA  --  154* 154* 151* 147*   K  --  3.4* 4.3 4.5 4.3   CL  --  117* 126* 124* 118*   CO2  --  29 27 23 25   BUN  --  71* 57* 42* 34*   CREATININE  --  1.68* 1.47* 1.49* 1.27   MG  --  2.7*  -- 
  Sig: Take 1 tablet by mouth daily   guaiFENesin-dextromethorphan (ROBITUSSIN DM) 100-10 MG/5ML syrup   Yes Yes   Sig: Take 10 mLs by mouth every 4 hours as needed for Cough   ipratropium 0.5 mg-albuterol 2.5 mg (DUONEB) 0.5-2.5 (3) MG/3ML SOLN nebulizer solution   Yes Yes   Sig: Inhale 3 mLs into the lungs every 6 hours as needed for Shortness of Breath   methylPREDNISolone (MEDROL DOSEPACK) 4 MG tablet   Yes Yes   Sig: Take 1 tablet by mouth See Admin Instructions Take by mouth.   metoprolol succinate (TOPROL XL) 100 MG extended release tablet   Yes Yes   Sig: Take 1 tablet by mouth daily   nitroGLYCERIN (NITROSTAT) 0.4 MG SL tablet   Yes Yes   Sig: Place 1 tablet under the tongue every 5 minutes as needed for Chest pain up to max of 3 total doses. If no relief after 1 dose, call 911.   ondansetron (ZOFRAN) 4 MG tablet   Yes Yes   Sig: Take 1 tablet by mouth every 4 hours as needed for Nausea or Vomiting   tamsulosin (FLOMAX) 0.4 MG capsule   Yes Yes   Sig: Take 1 capsule by mouth 2 times daily   vitamin C (ASCORBIC ACID) 500 MG tablet   Yes Yes   Sig: Take 1 tablet by mouth daily   zolpidem (AMBIEN) 5 MG tablet   Yes Yes   Sig: Take 1 tablet by mouth nightly as needed for Sleep. Max Daily Amount: 5 mg      Facility-Administered Medications: None       Current Facility-Administered Medications   Medication Dose Route Frequency    sodium chloride flush 0.9 % injection 5-40 mL  5-40 mL IntraVENous 2 times per day    sodium chloride flush 0.9 % injection 5-40 mL  5-40 mL IntraVENous PRN    0.9 % sodium chloride infusion   IntraVENous PRN    potassium chloride (KLOR-CON M) extended release tablet 40 mEq  40 mEq Oral PRN    Or    potassium chloride (KLOR-CON) 20 MEQ packet 40 mEq  40 mEq Oral PRN    Or    potassium chloride 10 mEq/100 mL IVPB (Peripheral Line)  10 mEq IntraVENous PRN    magnesium sulfate 2000 mg in 50 mL IVPB premix  2,000 mg IntraVENous PRN    ondansetron (ZOFRAN-ODT) disintegrating tablet 4 mg  4 mg

## 2024-02-06 NOTE — DISCHARGE SUMMARY
[facility]   The specimen is NEGATIVE for SARS-CoV2, the novel coronavirus associated with COVID-19. A negative result does not rule out COVID-19.   This test has been authorized by the FDA under an Emergency Use Authorization (EUA) for use by authorized laboratories.   Fact sheet for Healthcare Providers:  https://www.fda.gov/media/456449/download Fact sheet for Patients: https://www.fda.gov/media/225031/download   Methodology: Isothermal Nucleic Acid Amplification       Blood Culture 1 [[id number]] Collected: 01/27/24 0105    Order Status: Completed Specimen: Blood Updated: 02/02/24 1351     Special Requests No Special Requests        Culture No growth 6 days       Blood Culture 2 [[id number]] Collected: 01/27/24 0105    Order Status: Completed Specimen: Blood Updated: 02/02/24 1351     Special Requests No Special Requests        Culture No growth 6 days                   Discharge Medications:     Medication List        ASK your doctor about these medications      amLODIPine 5 MG tablet  Commonly known as: NORVASC     atorvastatin 40 MG tablet  Commonly known as: LIPITOR     ferrous sulfate 325 (65 Fe) MG tablet  Commonly known as: IRON 325     finasteride 5 MG tablet  Commonly known as: PROSCAR     furosemide 20 MG tablet  Commonly known as: LASIX     guaiFENesin-dextromethorphan 100-10 MG/5ML syrup  Commonly known as: ROBITUSSIN DM     ipratropium 0.5 mg-albuterol 2.5 mg 0.5-2.5 (3) MG/3ML Soln nebulizer solution  Commonly known as: DUONEB     methylPREDNISolone 4 MG tablet  Commonly known as: MEDROL DOSEPACK     metoprolol succinate 100 MG extended release tablet  Commonly known as: TOPROL XL     nitroGLYCERIN 0.4 MG SL tablet  Commonly known as: NITROSTAT     ondansetron 4 MG tablet  Commonly known as: ZOFRAN     OXYGEN     tamsulosin 0.4 MG capsule  Commonly known as: FLOMAX     therapeutic multivitamin-minerals tablet     vitamin C 500 MG tablet  Commonly known as: ASCORBIC ACID     Xtandi 40 MG

## 2024-02-06 NOTE — CARE COORDINATION
CM verified patient has a qualifying hospital stay for Skilled Nursing Facility.  .  Inpt Admit date of 1/27/24. ALEXA ISIDRO  [phone]  
Care Management Initial Assessment       RUR: 17% - \"moderate risk\"  Readmission? No  1st IM letter given? No - 1st IM to be provided by Patient Access Team  1st  letter given: No    Update - 11:42 AM: Referral sent via [employer] to [facility] for reference.    Initial note: Chart reviewed. Pt transferred to [facility] from [facility] ED for upper GI bleed with melena and acute blood loss anemia requiring transfusion. Pt confused; CM deferred to pt's son/legal NOK (Laith Amador: [phone]) to verify demographic information. CM introduced role & completed initial assessment. Pt lives alone at address on file; home is one story with  JOH[place]. Pt identified pt's significant other (Genoveva Selin: [phone]) as a prominent source of support. Per son, pt's significant other assists with transportation when needs arise.    Per son, prior to the last month or so, pt was independent with ADL's. Son reported pt has access to DME in the form of a cane, RW, shower chair, & elevated toilet seat. Per son, pt has no prior Hx of utilizing HH/IPR interventions. Son confirmed pt is currently utilizing SNF intervention for rehab; services provided by [facility] on [address]. Son requested for pt to return to [facility] upon d/c to resume intervention; FOC offered/obtained. Son reported he's waiting for the RN to contact him back to provide clinical updates; no immediate CM questions/concerns identified. CM will continue to follow & remain accessible for d/c planning needs. Full assessment detailed below:     01/29/24 1114   Service Assessment   Patient Orientation Unable to Assess   Cognition Dementia / Early Alzheimer's   History Provided By Child/Family  (son/legal NOK (Laith Amador))   Primary Caregiver Self   Support Systems Spouse/Significant Other;Children   Patient's Healthcare Decision Maker is: Legal Next of Kin  (son (Laith Amador: [phone]) is 
Transition of Care Plan:     RUR: 17% (moderate RUR)  Prior Level of Functioning: Independent; needing assistance now  Disposition: [facility] vs. [place] [facility]F?   If SNF or IPR: Date FOC offered: 1/29 SNF  Date FOC received: 1/29 [place]  Accepting facility: N/A  Date authorization started with reference number: N/A  Date authorization received and expires: N/A  Follow up appointments: N/A  DME needed: TBD.  Patient has a shower chair, toilet raiser, cane and RW at home.  Trans[facility] at discharge: [facility]  IM/IMM Medicare/ letter given: 2nd IM needed prior to discharge.  Is patient a [place] and connected with VA? No.              If yes, was  transfer form completed and VA notified? N/A  Caregiver Contact: Laith dia - [phone] - [email or web address]  Discharge Caregiver contacted prior to discharge? CM to contact.  Care Conference needed? No.  Barriers to discharge: hospice info session, bed availability    CM contacted [facility] to try and get an update on whether they met/spoke with family about services. Awaiting response at this time. CM also contacted [facility] to request update on referral. Awaiting response at this time.     Bisi Negrete LCSW  [facility] Care Manager  [phone]    
Transition of Care Plan:    RUR: 17% (moderate RUR)  Prior Level of Functioning: Independent; needing assistance now  Disposition: Pending clinical progression - hospice?  If SNF or IPR: Date FOC offered: 1/29 SNF  Date FOC received: 1/29 [facility] facility: N/A  Date authorization started with reference number: N/A  Date authorization received and expires: N/A  Follow up appointments: N/A  DME needed: TBD.  Patient has a shower chair, toilet raiser, cane and RW at home.  Trans[facility] at [facility]: [facility]  IM/IMM Medicare/ letter given: 2nd IM needed prior to discharge.  Is patient a [place] and connected with VA? No.   If yes, was [place] transfer form completed and VA notified? N/A  Caregiver Contact: Laith dia - [phone] - [email or web address]  Discharge Caregiver contacted prior to discharge? CM to contact.  Care Conference needed? No.  Barriers to discharge: hospice info session    0903 - CM sent a message to [facility] to determine if they were able to meet/speak with family.      MUSA BauerN, RN    Care Management  [phone]  
Transition of Care Plan:    RUR: 17% (moderate RUR)  Prior Level of Functioning: Independent; needing assistance now  Disposition: Pending clinical progression - hospice?  If SNF or IPR: Date FOC offered: 1/29 SNF  Date FOC received: 1/29 [facility]  Accepting facility: N/A  Date authorization started with reference number: N/A  Date authorization received and expires: N/A  Follow up appointments: N/A  DME needed: TBD.  Patient has a shower chair, toilet raiser, cane and RW at home.  [facility] at [facility]: [facility]  IM/IMM Medicare/ letter given: 2nd IM needed prior to discharge.  Is patient a [place] and connected with VA? No.   If yes, was [place] transfer form completed and VA notified? N/A  Caregiver Contact: Laith dia - [phone] - [email or web address]  Discharge Caregiver contacted prior to discharge? CM to contact.  Care Conference needed? No.  Barriers to discharge: hospice info session    1324 - Information session request sent to  Hospice.    1343 to 1350 -  Hospice cannot service [facility].  CM spoke with [place], who is agreeable to meeting with any accepting hospice agency.  Referral sent to [facility] via [facility].  CM notified Laith that due to patient not having Medicaid, they would likely need to finance the cost of LTC or CGs in the home for hospice care.  LTC and CG list sent to Laith via email: [email or web address].      LEE Bauer, RN    Care Management  [phone]  
  Services At/After Discharge   Transition of Care Consult (CM Consult) SNF   Partner SNF No   Reason Why Partner SNF Not Chosen Location   Services At/After Discharge PT;OT   Mode of Transport at Discharge Other (see comment)  ([facility])   Hospital Transport Time of Discharge 1200   Confirm Follow Up Transport Family   Condition of Participation: Discharge Planning   The Plan for Transition of Care is related to the following treatment goals: Return home after SNF   The Patient and/or Patient Representative was provided with a Choice of Provider? Patient Representative   Name of the Patient Representative who was provided with the Choice of Provider and agrees with the Discharge Plan?  Laith Amador (son)   The Patient and/Or Patient Representative agree with the Discharge Plan? Yes   Freedom of Choice list was provided with basic dialogue that supports the patient's individualized plan of care/goals, treatment preferences, and shares the quality data associated with the providers?  Yes         MUSA BauerN, RN    Care Management  [phone]  
Provider? Patient Representative   Name of the Patient Representative who was provided with the Choice of Provider and agrees with the Discharge Plan?  Laith Amador (son)   The Patient and/Or Patient Representative agree with the Discharge Plan? Yes   Freedom of Choice list was provided with basic dialogue that supports the patient's individualized plan of care/goals, treatment preferences, and shares the quality data associated with the providers?  Yes         MUSA BauerN, RN    Care Management  [phone]

## 2024-02-07 NOTE — PROGRESS NOTES
Hospitalist Progress Note    NAME:   Quinn Amador   : 1940   MRN: [id number]     Date/Time: 2024 9:52 AM  Patient PCP: David Castorena MD    Estimated discharge date:24  Barriers: possible hospice vs SNF placement      Assessment / Plan:  Paroxysmal atrial fib with RVR Recurrent  then , dropped BP  Hypotension in setting of atrial fib   Possible left CHF POA tachypneic, pBNP 3337  CAD s/p CABG  Elevated troponin 293  Cortisol 22.1  Known prior atrial fib, earlier in admit was NSR  Eliquis stopped with recurrent GI bleeding  Recurrent atrial fib with RVR:   IV cardizem gtt, resolved and gtt stopped   recurrent atrial fib with RRLow BP, dropped further on cardizem   Amiodarone gtt and IV digoxin x 2  HR improved   converted to NSR Transition to Po amiodarone  Echo LVEF 50-55%, normal wall motion, normal RV function  Cardiology followed the patient-advised for comfort care only.  AC on hold with anemia and ? Recurrent GI bleeding-continue to hold on discharge  Tele to be continued  Discussed with the family ON 24- son wanted hospice measures for patient . Hospice discussed with the family - need to find a hospice provider for him , case management informed and would follow up .   Patient can be discharged to SNF if hospice facility not available because of his insurance.  Per case management's note-[facility] was contacted awaiting response.  Also awaiting response from [facility].  Once hospice/SNF available patient can be discharged.  Discussed again with case management - patient is stable medically to be discharged in either places      Possible upper GI bleed POA  Acute blood loss anemia POA HgB 6.1 --> 8.0 --> 7.0 --> 6.9  Iron deficiency anemia POA  Hemoglobin 5.9 at nursing home and 6.1 in ED  1 Unit pRBCs at [facility] before transfer to [facility]  IV Protonix every 12h.  Continue for now  Consulted GI  Reportedly hospitalized at [facility] 
     Nutrition Note    Chart reviewed. Noted planning for discharge with hospice. Will sign off. RD available by consult if needed.     Electronically signed by Amanda Knox RD on 2/2/24 at 2:47 PM EST    Contact: [phone]    
    Hospitalist Progress Note    NAME:   Quinn Amador   : 1940   MRN: [id number]     Date/Time: 2024 5:25 PM    Patient PCP: David Castorena MD    Hospital Problem list:     ? Upper GI bleed POA  Acute blood loss anemia POA HgB 6.1 --> 8.0 --> 7.0  Iron deficiency anemia POA  Hemoglobin 5.9 at nursing home and 6.1 in ED   1 Unit pRBCs at [facility]  IV Protonix every 12h.    Consult GI  Reportedly hospitalized at [facility] earlier this month with GI bleed  EGD with ? duodenitis  HgB dropped a bit to 7.0  Continue serial HgBs  Normal LDH, elevated haptoglobin    Acute kidney injury POA creat 2.01 --> 1.47  CKD stage III POA baseline creatinine of 1.24 in 2023  Hypernatremia Na 147 --> 154 POA due to dehydration  Hypercalcemia Ca ~ 12.8 corrected for albumin  Poor PO intake x days   ? Elevated calcium due to hypovolemia  Hold lasix  Transition to D5 0.2% saline  Increase PO intake  Renal US  Serial labs     SIRS POA WBC 18.1, , no fevers  Low suspicion for sepsis at this time.   Recent Medrol dose pack, ? Demargination as cause of elevated WBC  Procalcitonin 0.23  Blood cultures 4 negative  Chest x-ray with atelectasis versus pneumonia.     No cough, shortness of breath, fever or hypoxia  Rapid COVID negative.    Holding antibiotics  Check pBNP     Chronic left CHF POA  HTN  Persistent A-fib with mild RVR  Hold lasix, amlodipine to avoid hypotension  pBNP  Reduce metoprolol from 100mg XL to 50mg tartrate bid  Eliquis stopped this month due to blood loss anemia, GI bleed    Prostate cancer   BPH  Cont flomax, finasteride,  Cont xtandi -- pt will have to provide     Code Status: DNR as from Nursing home order and confirmed with patient.    Daughter Krysta Elias [phone] and significant other Genoveva Smallwood [phone]  DVT Prophylaxis: SCD  GI Prophylaxis: Protonix  Baseline:     History, assessment and plan for 2024:     Estimated discharge date: 2024    Needs to be 
    Hospitalist Progress Note    NAME:   Quinn Amador   : 1940   MRN: [id number]     Date/Time: 2024 1:37 PM    Patient PCP: David Castorena MD    Hospital Problem list:     Paroxysmal atrial fib with RVR Recurrent  then , dropped BP  Hypotension in setting of atrial fib   Possible left CHF POA tachypneic, pBNP 3337  CAD s/p CABG  Elevated troponin 293  Known prior atrial fib, earlier in admit was NSR   Eliquis stopped with recurrent GI bleeding  Recurrent atrial fib with RVR   IV cardizem gtt, resolved and gtt stopped   recurrent atrial fib with RR   Low BP, dropped further on cardizem   Amiodarone gtt and IV digoxin x 2   HR improved   converted to NSR  Transition to Po amiodarone  Echo LVEF 50-55%, normal wall motion, normal RV function  Good cards following  AC on hold with anemia and ? Recurrent GI bleeding  Tele  Cortisol 22.1    ? Upper GI bleed POA  Acute blood loss anemia POA HgB 6.1 --> 8.0 --> 7.0 --> 6.9  Iron deficiency anemia POA  Hemoglobin 5.9 at nursing home and 6.1 in ED   1 Unit pRBCs at [facility] before transfer to [facility]  IV Protonix every 12h.    Consulted GI  Reportedly hospitalized at [facility] earlier this month with GI bleed 2024 - 2024   2 units pRBCs  EGD with ? Duodenitis  No colonoscopy  D/C to SNF  Still getting eliquis at 2024  HgB dropped to 6.9, give additional unit pRBCs on 2024  Continue serial HgBs now 7.3 to 8.5  Normal LDH, elevated haptoglobin  No clear recurrent GI bleeding, NS reported no melena or BRBPR today  GI signed off    Acute kidney injury POA creat 2.01 --> 1.16  CKD stage III POA baseline creatinine of 1.24 in 2023  Hypernatremia Na 147 --> 154 --> 144 POA due to dehydration  Hypercalcemia Ca ~ 12.6 corrected for albumin, ? malignancy  Poor PO intake x days   ? Elevated calcium due to hypovolemia   Known prostate cancer with bone mets  Held lasix at admit  S/p Hypotonic IVF  Increase 
    Hospitalist Progress Note    NAME:   Quinn Amador   : 1940   MRN: [id number]     Date/Time: 2024 10:27 AM    Patient PCP: David Castorena MD    Hospital Problem list:     Paroxysmal atrial fib with RVR this AM  BP borderline low  Possible left CHF POA tachypneic, pBNP 3337  CAD s/p CABG  Known prior atrial fib, yesterday was in NSR   Eliquis stopped with recurrent GI bleeding  Not taking pills well   Unclear if he got Po metoprolol this AM  IV cardizem gtt with no bolus, see how BP does  Echo and TSH  Consult VCS  Dr [facility] in [place]  Baseline wears O2  Check CXR, may need to resume lasix soon  AC on hold with anemia and ? Recurrent GI bleeding    ? Upper GI bleed POA  Acute blood loss anemia POA HgB 6.1 --> 8.0 --> 7.0 --> 6.9  Iron deficiency anemia POA  Hemoglobin 5.9 at nursing home and 6.1 in ED   1 Unit pRBCs at [facility]  IV Protonix every 12h.    Consulted GI, no clear active bleeding   Given recent work up, no current indication for invasive work up  Reportedly hospitalized at [facility] this month with GI bleed 2024 - 2024   2 units pRBCs  EGD with ? Duodenitis  No colonoscopy  D/C to SNF  Still getting eliquis at 2024  HgB dropped to 6.9, give additional unit pRBCs on 2024  Continue serial HgBs  Normal LDH, elevated haptoglobin  Discussed with NS, no recurrent bleeding overnight    Acute kidney injury POA creat 2.01 --> 1.27  CKD stage III POA baseline creatinine of 1.24 in 2023  Hypernatremia Na 147 --> 154 --> 147 POA due to dehydration  Hypercalcemia Ca ~ 12.8 corrected for albumin, not improved with IVF  Poor PO intake x days   ? Elevated calcium due to hypovolemia  Hold lasix  Transition to D5 0.2% saline  Increase PO intake  Renal US  Serial labs  Calcium not improving with IVF   Check intact PTH with the cancer history  Not eating past few weeks     SIRS POA WBC 18.1, , no 
  Discharge Summary     Patient: Quinn Amador MRN: [id number]  SSN: [id number]    YOB: 1940  Age: 83 y.o.  Sex: male       Code Status: DNR  Allergies:   Allergies   Allergen Reactions    Eliquis [Apixaban]      Unknown, listed from [facility]    Oxycodone      [facility]    Penicillins Hives       Admit Date: 1/27/2024    Discharge Date: 2/6/2024      Admission Diagnoses: GI bleed [K92.2]    PMH: No past medical history on file.    Social History:  Social history   Social History     Tobacco Use    Smoking status: Not on file    Smokeless tobacco: Not on file   Substance Use Topics    Alcohol use: Not on file     Drug History   Social History     Substance and Sexual Activity   Drug Use Not on file     Familiy History No family history on file.    Consults: Cardiology, Gastroenterology, and Urology    Significant Diagnostic Studies:     Discharge Condition: Fair    Disposition: SNF    Discharge Medications:   Current Discharge Medication List        START taking these medications    Details   metoprolol tartrate (LOPRESSOR) 25 MG tablet Take 1 tablet by mouth 2 times daily  Qty: 60 tablet, Refills: 3      amiodarone (CORDARONE) 200 MG tablet Take 1 tablet by mouth daily  Qty: 30 tablet, Refills: 1      pantoprazole (PROTONIX) 40 MG tablet Take 1 tablet by mouth in the morning and at bedtime  Qty: 60 tablet, Refills: 1      aspirin 81 MG chewable tablet Take 1 tablet by mouth daily  Qty: 30 tablet, Refills: 3           CONTINUE these medications which have NOT CHANGED    Details   vitamin C (ASCORBIC ACID) 500 MG tablet Take 1 tablet by mouth daily      finasteride (PROSCAR) 5 MG tablet Take 1 tablet by mouth daily      ferrous sulfate (IRON 325) 325 (65 Fe) MG tablet Take 1 tablet by mouth 2 times daily      atorvastatin (LIPITOR) 40 MG tablet Take 1 tablet by mouth daily      ipratropium 0.5 mg-albuterol 2.5 mg (DUONEB) 0.5-2.5 
  GI PROGRESS NOTE  Galina Bowling NP  [phone] NP in-hospital cell phone M-F until 4:30  After 5pm or on weekends, please call  for physician on call    NAME:Quinn Amador :1940 MRN:[id number]   ATTG: Dr. Gallardo  PCP: David Castorena MD  Date/Time:  2024 1:59 PM   Primary GI: Dr. Anshu Jones  Reason for following: Recurrent anemia  Assessment:   Acute recurrent anemia: Likely multifactorial vs mild oozing from recently noted duodenitis on EGD at [facility] [place]. Had poor prep colonoscopy at the same time. Currently no signs of active bleeding with appropriate response to transfusion. Did note a slight drift down in hgb. For now high risk for procedures will monitor and consider EGD/colonoscopy if recurrent significant bleeding.   Melena - hemoccult positive  Hgb with down trend to 6.9 today  Hypernatremia with renal insufficiency  Multiple co-morbidities: CAD, CHF, afib, COPD, metastatic prostate cancer  Plan:   Remains high risk for any further procedures given AMS and co-mordities and unlikely to tolerate any bowel prep given poor PO intake  Would consider GOC discussion   Continue to monitor for obvious bleeding (avoid hemoccult - no role in GI bleed)  Continue diet as tolerated  PPI BID  Electrolyte management per primary team  Nothing further to add from a GI standpoint.  GI signing-off.  Please call with any questions.  Thank you for this consult.    Plan discussed with Dr. Ramos  Subjective:   Discussed with RN events overnight. Patient altered this morning though more responsive and able to answer some yes or no questions. Per nursing, no report of black or bloody stools. Minimial PO intake. Heart rate tachycardic following working with PT.     Review of Systems:  Symptom Y/N Comments  Symptom Y/N Comments   Fever/Chills N   Chest Pain N    Cough N   Headaches N    Sputum N   Joint Pain N    SOB/PARRY N   Pruritis/Rash N    Tolerating Diet  minimal  Other       Could NOT 
  GI PROGRESS NOTE  Galina Bowling NP  [phone] NP in-hospital cell phone M-F until 4:30  After 5pm or on weekends, please call  for physician on call    NAME:Quinn Amador :1940 MRN:[id number]   ATTG: Dr. Gallardo  PCP: David Castorena MD  Date/Time:  2024 10:21 AM   Primary GI: Dr. Anshu Jones  Reason for following: Recurrent anemia  Assessment:   Acute recurrent anemia: Likely multifactorial vs mild oozing from recently noted duodenitis on EGD at [facility]. Had poor prep colonoscopy at the same time. Currently no signs of active bleeding with appropriate response to transfusion. Did note a slight drift down in hgb. For now high risk for procedures will monitor and consider EGD/colonoscopy if recurrent significant bleeding.   Melena - hemoccult positive  Hgb with down trend to 6.9 today  Hypernatremia with renal insufficiency  Multiple co-morbidities: CAD, CHF, afib, COPD, metastatic prostate cancer  Plan:   Remains high risk for any further procedures given AMS and co-mordities  Continue to monitor for obvious bleeding (avoid hemoccult - no role in GI bleed)  Continue clear liquid diet  PPI BID  Electrolyte management per primary team  Plan discussed with Dr. Ramos  Subjective:   Discussed with RN events overnight. Patient altered this morning and refusing his morning medications. Per nursing, no report of black or bloody stools. Patient endorses abd pain.     Review of Systems:  Symptom Y/N Comments  Symptom Y/N Comments   Fever/Chills N   Chest Pain N    Cough N   Headaches N    Sputum N   Joint Pain N    SOB/PARRY N   Pruritis/Rash N    Tolerating Diet    Other       Could NOT obtain due to:      Objective:   VITALS:   Last 24hrs VS reviewed since prior progress note. Most recent are:  Vitals:    24 0722   BP: 104/65   Pulse: (!) 137   Resp: 23   Temp: 98.4 °F (36.9 °C)   SpO2:      No intake or output data in the 24 hours ending 24 1021  PHYSICAL 
0700 :Bedside and Verbal shift change report given to RADHA Jones (oncoming nurse) by RADHA Momin (offgoing nurse). Report included the following information Nurse Handoff Report, Index, Intake/Output, MAR, Recent Results, and Cardiac Rhythm A-fib .     0909: pt refused morning medication. HR is in 150's. Received in report that pt recieved metoprolol this morning however it wasn't documented. Notified Paulina CAMARILLO, awaiting response.     End of Shift Note    Bedside shift change report given to RADHA momin (oncoming nurse) by Jaron Burrell RN (offgoing nurse).  Report included the following information SBAR, Kardex, Intake/Output, MAR, Recent Results, and Cardiac Rhythm A-fib    Shift worked:  7a-7p     Shift summary and any significant changes:     See notes within chart.   - pt verbally aggressive, doesn't follow commands, and refused all morning PO meds.       Concerns for physician to address:       Zone phone for oncoming shift:          Activity:     Number times ambulated in hallways past shift: 0  Number of times OOB to chair past shift: 0    Cardiac:   Cardiac Monitoring: Yes           Access:  Current line(s): PIV     Genitourinary:   Urinary status: incontinent    Respiratory:      Chronic home O2 use?: NO  Incentive spirometer at bedside: NO       GI:     Current diet:  ADULT DIET; Clear Liquid  Passing flatus: YES  Tolerating current diet: YES       Pain Management:   Patient states pain is manageable on current regimen: YES    Skin:     Interventions: specialty bed, float heels, PT/OT consult, and limit briefs    Patient Safety:  Fall Score:    Interventions: bed/chair alarm, assistive device (walker, cane. etc), gripper socks, pt to call before getting OOB, and stay with me (per policy)       Length of Stay:  Expected LOS: 6  Actual LOS: 2      Jaron Burrell RN                            
0700 Bedside and Verbal shift change report given to Comfort RN (oncoming nurse) by Cindy RN (offgoing nurse). Report included the following information Nurse Handoff Report, Intake/Output, MAR, Cardiac Rhythm AFIB W/PVC'S, and Quality Measures.     0702 Shift assessment done, see flowsheet for details    0915 Patient awakens to voice, drowsy, sit up at 45 degrees. In-cahrge made aware that patient is drowsy and is high risk for aspiration, swallow screen done, passed.    0920 Oral medications crushed and given to patient w/ apple sauce with strict aspiration precaution. Rapid response nurse aware of the patient condition    1130 Dr. Paulina gates that patient converted to sinus rhythm, plan to shift IV amiodarone to p.o later. Patient is more interactive alert/oriented x 3. MD aware    1915 Bedside and Verbal shift change report given to Prema RN (oncoming nurse) by Cindy RN (offgoing nurse). Report included the following information Nurse Handoff Report, Intake/Output, MAR, Cardiac Rhythm NSR, and Quality Measures.     
0715: Bedside and Verbal shift change report given to Mercedes Yancey RN  (oncoming nurse) by RADHA Hartmann (offgoing nurse). Report included the following information Nurse Handoff Report, Index, ED Encounter Summary, Intake/Output, MAR, Recent Results, and Cardiac Rhythm NSR** .      0908: Swallow eval failed, pt unable to stay alert for testing. Oral medications held.     1724: Family at bedside, pt more alert. Oriented and more interactive with staff. Family assisting pt with sips of broth, pt has wet nonproductive cough following sips. MD notified, family notified of aspiration risk.   
0735: Bedside and Verbal shift change report given to Mercedes Yancey RN  (oncoming nurse) by RADHA Hartmann (offgoing nurse). Report included the following information Nurse Handoff Report, Index, ED Encounter Summary, Intake/Output, MAR, Recent Results, and Cardiac Rhythm Afib/NSR .      0940: Pt more alert this morning, attempted pills crushed in applesauce. Pt appeared to have difficulty swallowing applesauce and followed with a wet cough. MD Heladio notified & speech consult requested prior to discharge.     1025: Dressing change completed on L forearm skin tear.    1433: Report called to SNF.     1751: [facility] contacted for update on transport time. New ride time [id number] on 2/6/24.    1912: [facility] contacted to confirm ride time, scheduled for [id number] 2/6/24.    1915: [facility] contacted to confirm pt is able to be dropped off this evening as ride time has been pushed back to later this evening. Per RADHA Mckeon there is no cut off time to pt drop off.     1919: Nursing supervisor contacted to get [facility] cleared. Transport approved, [facility] contacted, transport set up for 2010 2/6/24.  
0918: Spoke with pt family (Genoveva Smallwood) regarding Enzalutamide. Family to bring medication this evening when they visit.   
1545: Attempted  
1900 Bedside and Verbal shift change report given to Shahbaz GARCIA (oncoming nurse) by Blaze GARCIA (offgoing nurse). Report included the following information Nurse Handoff Report, MAR, Recent Results, and Cardiac Rhythm A.Fib .     2100 Given a call back to pt [place] about pt update (Possible discharge to SNF/Hospice just waiting for response as per  and can be discharge after as per doctors notes). Inform to call him or Genoveva Selin for updates if possible.    End of Shift Note    Bedside shift change report given to Mercedes GARCIA (oncoming nurse) by Shahbaz Rock RN (offgoing nurse).  Report included the following information SBAR, MAR, Recent Results, and Cardiac Rhythm A.Fib    Shift worked:  7pm-7am     Shift summary and any significant changes:     Kept comfortable and needs attended     Concerns for physician to address:        Zone phone for oncoming shift:           Activity:     Number times ambulated in hallways past shift: 0  Number of times OOB to chair past shift: 0    Cardiac:   Cardiac Monitoring: Yes           Access:  Current line(s): PIV     Genitourinary:   Urinary status: voiding    Respiratory:      Chronic home O2 use?: NO  Incentive spirometer at bedside: YES       GI:     Current diet:  ADULT DIET; Clear Liquid  Passing flatus: YES  Tolerating current diet: YES       Pain Management:   Patient states pain is manageable on current regimen: YES    Skin:     Interventions: specialty bed, float heels, increase time out of bed, foam dressing, PT/OT consult, limit briefs, internal/external urinary devices, and nutritional support    Patient Safety:  Fall Score:    Interventions: bed/chair alarm, assistive device (walker, cane. etc), gripper socks, pt to call before getting OOB, stay with me (per policy), and gait belt       Length of Stay:  Expected LOS: 8  Actual LOS: 8    Rancho Score 13. All of the following interventions have been implemented to prevent pressure injury:    SKIN ASSESSMENT 
1900 Bedside and Verbal shift change report given to Shahbaz GARCIA (oncoming nurse) by Mercedes GARCIA (offgoing nurse). Report included the following information Nurse Handoff Report, MAR, Recent Results, and Cardiac Rhythm NSR .     End of Shift Note    Bedside shift change report given to RN (oncoming nurse) by Shahbaz Rock RN (offgoing nurse).  Report included the following information SBAR, MAR, Recent Results, and Cardiac Rhythm NSR    Shift worked:  7pm-7am     Shift summary and any significant changes:     Kept comfortable and needs attended     Concerns for physician to address:        Zone phone for oncoming shift:           Activity:     Number times ambulated in hallways past shift: 0  Number of times OOB to chair past shift: 0    Cardiac:   Cardiac Monitoring: Yes           Access:  Current line(s): PIV     Genitourinary:   Urinary status: voiding and external catheter    Respiratory:      Chronic home O2 use?: NO  Incentive spirometer at bedside: YES       GI:     Current diet:  ADULT DIET; Clear Liquid  Passing flatus: YES  Tolerating current diet: YES       Pain Management:   Patient states pain is manageable on current regimen: YES    Skin:     Interventions: specialty bed, float heels, increase time out of bed, foam dressing, PT/OT consult, limit briefs, internal/external urinary devices, and nutritional support    Patient Safety:  Fall Score:    Interventions: bed/chair alarm, assistive device (walker, cane. etc), gripper socks, pt to call before getting OOB, stay with me (per policy), and gait belt       Length of Stay:  Expected LOS: 10  Actual LOS: 9    Rancho Score 12. All of the following interventions have been implemented to prevent pressure injury:    SKIN ASSESSMENT (S)  Dual skin assessment completed at shift change: Yes  Name of second RN who completed Dual Skin Assessment: Mercedes GARCIA  Picture of wound uploaded to EMR: Yes  Venelex ordered and given per protocol: Yes  Wound care consulted if wounds 
1900 Bedside and Verbal shift change report given to Shahbaz RN (oncoming nurse) by Mercedes GARCIA (offgoing nurse). Report included the following information Nurse Handoff Report, MAR, Recent Results, and Cardiac Rhythm NSR .     Patient is for discharge and awaiting transport to [facility].    2037 Patient left department via stretcher accompanied by Home Health staff Francisco Hernandez(Endorsement given to him- pt going to [facility]).Documents and belongings given to them.     2040 Telemetry informed about pt discharge.  
[facility]  [facility] Help to Those in Need  [phone]    Nursing Note   Patient Name: Quinn Amador  YOB: 1940  Age: 83 y.o.    [facility] RN Note:      Reviewed chart: patient appears to be in decline.  Spoke with Mercedes GARCIA who provided update on patient's condition and decline (unable to take PO medications).  Patient assessed and does not meet [facility] hospice at this time.  Our team will continue to follow patient while at [facility].      MUSA BarretoN, RN, [facility]  Hospice Liaison  [phone] (mobile)  [phone] (main [facility] number)  Available on [employer]   
End of Shift Note    Bedside shift change report given to Gerber GARCIA (oncoming nurse) by Yanira Cordova RN (offgoing nurse).  Report included the following information SBAR, Kardex, MAR, Recent Results, and Cardiac Rhythm      Shift worked:0700 to 1930  ***     Shift summary and any significant changes:     ***     Concerns for physician to address:  ***     Zone phone for oncoming shift:   ***       Activity:       Number of times OOB to chair past shift: 1    Cardiac:   Cardiac Monitoring: Yes           Access:  Current line(s): PIV     Genitourinary:   Urinary status: external catheter    Respiratory:      Chronic home O2 use?: NO  Incentive spirometer at bedside: N/A       GI:     Current diet:  ADULT DIET; Clear Liquid  Passing flatus: YES  Tolerating current diet: YES       Pain Management:   Patient states pain is manageable on current regimen: N/A    Skin:     Interventions: specialty bed, float heels, PT/OT consult, and internal/external urinary devices    Patient Safety:  Fall Score:    Interventions: bed/chair alarm and gripper socks       Length of Stay:  Expected LOS: 7  Actual LOS: 6      Yanira Cordova RN                           
I have reviewed and am in agreement with the assessment and documentation as performed by my orientee, Jaron, RN. Please refer to Jaron's progress note for update on pt's daily events.     1412 Patient is awake but still confused and not following commands. Notified Paulina CAMARILLO - verbal orders received to get VBG    1702 Patient's LUE PIV infiltrated. LR bolus paused. Will attempt to get another PIV    1858 Attempted to reinsert PIV to resume bolus, unsuccessful    1930 nonblanchable red spot on left heel, and ?Pressure injury found on sacrum POA. Pictures taken and wound care consult placed per protocol  
Notified Dr Gallardo about patient's tachycardia and hgb of 6.9.  No further orders given at this time  
Notified MD patient hgb trending up 1830 hgb was 7.2.    
Nursing notified patient lost piv access, has ivf running ordered, but unable due to no piv access at present. Nursing reported pt eating and drinking better w/o difficulty, states pt to be d/c with hospice soon, states RUE mildly edematous from piv infiltration w/o concerns reported. No other concerns reported. VSS. Ordered stop ivf for now, nursing to attempt to place piv access when they are able, at this time reported difficulty to place access, this can be readdressed if to defer iv access with dayshift provider. At this time will hold ivf, nursing may do warm compresses.. Patient denies any further complaints or concerns. No acute distress reported. Nursing to notify Hospitalist for further/continued concerns. Will remain available overnight for further concerns if nursing/patient needs. Will defer further evaluation/management to the day shift team.    Non-billable note.     
Nursing notified patient sbp 77, asymptomatic, trop 226 w/o c/o chest pain, nursing reported they informed dayshift but also notifying me, nursing reported bolus finished up around 1900 that was ordered earlier for hypotension as well. Has amiodarone gtt infusing. No other concerns reported. VSS otherwise, appears recent doc as 80/59. Ordered LR bolus x1, nursing to recheck bp and notify me, repeat trop ordered, likely elevated 2/2 type 2 MI, appears cards already consulted and note from 1/29. Recent h/h up from prior, ordered h/h to continue to trend q6h, consider transfuse hgb <7.0, appears received prbcs already this admit several units w/GI reported no clear active bleeding source. No reported GIB by nursing. Patient denies any further complaints or concerns. No acute distress reported. Nursing to notify Hospitalist for further/continued concerns. Will remain available overnight for further concerns if nursing/patient needs. Will defer further evaluation/management to the day shift team.    Update:  Troponin 293 up slight from prior, asymptomatic w/o c/o chest pain. VSS. No other reported complaints at this time or concerns per nursing.     Non-billable note.    
Occupational Therapy     Chart reviewed pt. Per RN, pt still with severe AMS and is easily agitated per RN. OT services will defer until pt is more appropriate to be seen and will continue to follow pt.     Mando Stringer, BLAZE, OTR/L  
PT note:     Orders received and acknowledged. Chart reviewed and spoke with nursing. Patient minimally following commands and drowsy. RN in agreement to hold PT evaluation and will continue to follow.     Jessica Hensley, PT, DPT     
Patient in sustained SVT following PT/OT session and then went into Afib RVR with hypotension. Dr Hartman notified. MD ordered digoxin and fluid bolus. PT was in controlled Afib and BP elevated to low 100's systolic. Later in the afternoon at approximately 1740 pt again Afib RVR. Dr Gallardo notified as well as Rapid Response RN. MD ordered another dose of digoxin and another fluid bolus along with an Amio gtt. BMP, Troponin were sent to the lab. Troponin came back critical with a value of 226. Hgb was 8.5. MD notified of these results and is enroute to patients bedside.  
Patient is not cooperative with repositioning due to cognition and periods of confusion. Patient's family states that patient usually gets confused/hospital delirium while in the hospital and his mental congition improves as soon as he leaves the hospital. Patient constantly pulling out pillows used to reposition patient and keep him off his bottom which is very red. Venalex ointment applied BID (see MAR) to sacrum and heels. Patient being repositioned q2. Heels elevated. Specialty air bed ordered for patient.   
Patient is very agitated and confused. He pulled off leads and gown numerous times. Waxes and wanes between confusion and being oriented. Patient pulled off external cath and was wet. Patient cleaned up and linens changed with fresh gown.  
Patient still with severe AMS and easily agitated per RN. PT will defer and continue to follow for evaluation.    Nate Love, PT    
Patient's bed switched with speciality air bed.  
Physical Therapy    Chart reviewed. Spoke with nurse who reports patient cognition waxing and waning. Upon entering room patient noted to have taken his gown and leads off, attempting to pull the paper pad out from underneath of him. Notified nurse. Patient remains not appropriate for PT evaluation at this time. Will defer and attempt tomorrow if patient is medically appropriate.    Amber Rodriguez, PT, DPT  
Rancho Score 11. All of the following interventions have been implemented to prevent pressure injury:    SKIN ASSESSMENT (S)  Dual skin assessment completed at shift change: Yes  Name of second RN who completed Dual Skin Assessment: Eliza STEVE RN  Picture of wound uploaded to EMR: Yes  Venelex ordered and given per protocol: Yes  Wound care consulted if wounds present: NA    SURFACE (S)  Thomas air pump or specialty bed ordered: Yes  Type of bed: Iso bed  Only white chux used with specialty surfaces (no green chux used): Yes  Waffle cushion used for chair positioning: NA    KEEP MOVING (K)  Mobility status (Bedrest, Chairbound, UWA x 1 assist , 2 assist, Max assist): bedrest  Q2 hour turns documented: Yes  Refusals to turn and education provided documented: NA  Device used to float heels: heel pillow  PT/OT consulted: Yes    INCONTINENCE (I)  Incontinence status assessed Q2 hours: Yes  External catheter in use: NO  Barrier cream in use: yes    NUTRITION (N)  I/O's documented every 8 hours: Yes  Oral supplements ordered if appropriate: NA  Nutrition services consulted: N/A    All concerns about new DTI's must be escalated directly to attending MD, charge nurse, CCL and nurse director.   ]    
Rancho Score 12. All of the following interventions have been implemented to prevent pressure injury:    SKIN ASSESSMENT (S)  Dual skin assessment completed at shift change: Yes  Name of second RN who completed Dual Skin Assessment: RADHA Hartmann  Picture of wound uploaded to EMR: Yes  Venelex ordered and given per protocol: Yes  Wound care consulted if wounds present: Yes    SURFACE (S)  Jean air pump or specialty bed ordered: Yes  Type of bed: bertha with pump  Only white chux used with specialty surfaces (no green chux used): Yes  Waffle cushion used for chair positioning: bedbound    KEEP MOVING (K)  Mobility status (Bedrest, Chairbound, UWA x 1 assist , 2 assist, Max assist): mena assist  Q2 hour turns documented: Yes  Refusals to turn and education provided documented: NA  Device used to float heels: pillows  PT/OT consulted: Yes    INCONTINENCE (I)  Incontinence status assessed Q2 hours: Yes  External catheter in use: yes  Barrier cream in use: yes    NUTRITION (N)  I/O's documented every 8 hours: Yes  Oral supplements ordered if appropriate: Yes  Nutrition services consulted: Yes    All concerns about new DTI's must be escalated directly to attending MD, charge nurse, CCL and nurse director.      
Rancho Score 12. All of the following interventions have been implemented to prevent pressure injury:    SKIN ASSESSMENT (S)  Dual skin assessment completed at shift change: Yes  Name of second RN who completed Dual Skin Assessment: RADHA Mathews  Picture of wound uploaded to EMR: Yes  Venelex ordered and given per protocol: Yes  Wound care consulted if wounds present: Yes    SURFACE (S)  T[place]oli air pump or specialty bed ordered: Yes  Type of bed: Air bed  Only white chux used with specialty surfaces (no green chux used): Yes  Waffle cushion used for chair positioning: Bedbound    KEEP MOVING (K)  Mobility status (Bedrest, Chairbound, UWA x 1 assist , 2 assist, Max assist): Bedbound  Q2 hour turns documented: Yes  Refusals to turn and education provided documented: NA  Device used to float heels: Heels up  PT/OT consulted: Yes    INCONTINENCE (I)  Incontinence status assessed Q2 hours: Yes  External catheter in use: Yes  Barrier cream in use: Yes    NUTRITION (N)  I/O's documented every 8 hours: Yes  Oral supplements ordered if appropriate: Yes  Nutrition services consulted: Yes    All concerns about new DTI's must be escalated directly to attending MD, charge nurse, CCL and nurse director.      
[facility]  Good Help to Those in Need  [phone]     Patient Name: Quinn Amador  YOB: 1940  Age: 83 y.o.    [facility] RN Note:  Hospice consult received, reviewing chart. Will follow up with Unit Nurse and Care Manager to discuss plan of care, patient status and discharge disposition within the hour.     Patient is outside of service area and is not GIP appropriate, taking oral meds. Spoke with JOVON Ness, she will sed referral to company that services area.     Thank you for the opportunity to be of service to this patient.    LEE Marshall, RN  Clinical Nurse Liaison  [facility]  [phone]  Office: [phone]  Available on Perfect Serve    
Speech LAnguage Pathology EVALUATION/DISCHARGE    Patient: Quinn Amador (83 y.o. male)  Date: 2/6/2024  Primary Diagnosis: GI bleed [K92.2]       Precautions: Fall Risk, Bed Alarm, Up as Tolerated                  ASSESSMENT :  Based on the objective data described below, the patient presents with a grossly functional oropharyngeal swallow. Patient received L leaning, slouched in bed. OT assisted this writer with repositioning patient prior to PO trials. Patient self-fed sequential sips of thin liquid via straw without overt signs of aspiration. Oral manipulation and mastication assessed to be timely and complete. No overt difficulty observed with any consistency tried.    Suspect patient will benefit from 1:1 assistance with feeding given positioning. At this time, recommend Easy to Chew/Thin Liquid diet with general aspiration precautions outlined below.    Patient will be discharged from skilled speech-language pathology services at this time.     PLAN :  Recommendations and Planned Interventions:  Diet: Easy to chew and thin liquids  -- Medication as tolerated  -- As upright as possible for PO intake  -- 1:1 assistance with feeding  -- Aspiration precautions: upright for all PO, small bites/sips, slow rate of intake, single sips (1 at a time), alternate liquids and solids as needed, ensure oral cavity clearance after meals        Acute SLP Services: No, patient will be discharged from acute skilled speech-language pathology at this time.    Discharge Recommendations: No, additional SLP treatment not indicated at discharge     SUBJECTIVE:   Patient stated, “my hip.”    OBJECTIVE:   No past medical history on file.No past surgical history on file.    Prior Level of Function/Home Situation:   Social/Functional History  Lives With: Alone  Type of Home: House  Home Layout: One level  Home Access:  (8 JOHANA)  Bathroom Shower/Tub: Shower chair with back  Bathroom Toilet: Handicap height  Bathroom Equipment: Shower 
Speech Pathology Note     Initial SLP evaluation complete. Full note to follow. Recommend Easy to Chew/Thin Liquid diet with 1:1 assistance with meal set-up and feeding as needed and general aspiration precautions (i.e. upright for all PO intake, small bites/sips, single sips 1 at a time, alternate liquids/solids as needed, etc.). Thank you.     Francisco Lemons M.S., CCC-SLP  
Spiritual Care Partner Volunteer visited patient at [facility] in [facility] 2 CARDIAC MEDICAL STEP DOWN on 2/2/2024   Documented by:  Daysi Partida, MPS, BCC, Staff   [facility]     Paging Service  [phone]      
enzalutamide (Xtandi) is not stocked in the central pharmacy. Please notify pharmacy whether or not patient can supply medication for inpatient use. Thanks    
AMS.  Also advised to continue goals of care discussion.     Acute kidney injury POA creat 2.01 --> 1.16  CKD stage III POA baseline creatinine of 1.24 in July 2023  Hypernatremia Na 147 --> 154 --> 144 POA due to dehydration  Hypercalcemia Ca ~ 12.6 corrected for albumin, ? malignancy  Poor PO intake x days  ? 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 Hospital delirium  Low suspicion for sepsis at this time.  Recent Medrol dose pack, ? Demargination as cause of elevated WBC  Procalcitonin 0.23  Blood cultures x 4 negative  Chest x-ray with atelectasis versus pneumonia.    Rapid COVID negative.    Holding antibiotics since admit  Head CT negative  Friend Genoveva report \" he always get confused in the hospital\"   As per discussion with the nursing staff he is waxing and waning.Patient appeared to be awake and alert today and participating in conversation   Fall precaution      Prostate cancer with ? Bone mets  BPH  Cont flomax, finasteride,  Cont xtandi , Lupron  PET scan per friend in August with no cancer cells per friend Genoveva  Bone lesions in left leg, chronic pain in left leg  Discussed case with Dr Hsieh  Appears from available data patient is responding to Rx  PSA- 0.2        Medical Decision Making:   I personally reviewed labs: CBC BMP  I personally reviewed imaging: None  I personally reviewed EKG: None  Toxic drug monitoring: None  Discussed case with: Jeevan Perez-[id number]     Code Status: DNR  DVT Prophylaxis: SCD  GI Prophylaxis: PPI    Subjective:     Chief Complaint / Reason for Physician Visit  Patient currently admitted and treated for 
discharge date: 1/30/2024    Needs to be done before discharge:  Stable GI bleed, improved creatinine and Na    Reason for physician visit:    \"I am not SOB\".  Discussed with RN events overnight.   Denies SOB, but currently on O2  HgB improved after pRBCs  No HA, SOB, cough, CP, abdominal pain, N/V, diarrhea    Medical Decision Making:   I personally reviewed labs: CBC, CMP  I personally reviewed imaging:  I personally reviewed EKG:  Toxic drug monitoring:   Discussed case with: Pt, NS, daughter    Total NON critical care TIME:  40  Minutes    Total CRITICAL CARE TIME Spent:   Minutes non procedure based    Objective:     VITALS:   Last 24hrs VS reviewed since prior progress note. Most recent are:  Patient Vitals for the past 24 hrs:   BP Temp Temp src Pulse Resp SpO2   01/27/24 1525 117/74 97.7 °F (36.5 °C) Oral (!) 101 13 96 %   01/27/24 1445 -- -- -- (!) 103 21 --   01/27/24 1400 102/84 -- -- 95 17 95 %   01/27/24 1200 129/83 -- -- (!) 107 24 96 %   01/27/24 0815 122/71 -- -- (!) 103 21 95 %   01/27/24 0600 118/81 -- -- (!) 106 20 96 %   01/27/24 0445 117/79 98.6 °F (37 °C) Axillary (!) 103 19 --       No intake or output data in the 24 hours ending 01/27/24 1700     I had a face to face encounter and independently examined this patient on 1/27/2024, as outlined below:    PHYSICAL EXAM:  General: Alert, cooperative  EENT:  Anicteric sclerae.  Resp:  CTA bilaterally, no wheezing or rales.  No accessory muscle use  CV:  Regular  rhythm,  No edema  GI:  Soft, Non distended, Non tender.  +Bowel sounds  Neurologic:  Alert and oriented X 2, normal speech,   Psych:   Not anxious nor agitated  Skin:  No rashes.  No jaundice    Reviewed most current lab test results and cultures  YES  Reviewed most current radiology test results   YES  Review and summation of old records today    NO  Reviewed patient's current orders and MAR    YES  PMH/SH reviewed - no change compared to 
arrange for PSA and Lupron in late January/ early February.  All questions and concerns were addressed prior to the patient leaving the clinic. The patient understands and agrees with the plan.     Pt is due for his Lupron injection at the office 2/1/24 has appt at [place] office.  Will check PSA while  in hospital.   Full consult to follow  
upon discharge.        Medical Decision Making:   I personally reviewed labs: CBC BMP  I personally reviewed imaging: None  I personally reviewed EKG: None  Toxic drug monitoring: None  Discussed case with: Son Chris-[phone]     Code Status: DNR  DVT Prophylaxis: SCD  GI Prophylaxis: PPI       Subjective:     Chief Complaint / Reason for Physician Visit  Patient currently admitted and treated for atrial fibrillation with rapid ventricular rate.  He also had drop in hemoglobin.He developed acute change in his mentation - waxing and waning . Son would want comfort / hospice measures only at this moment .     Labs and chart reviewed and patient examined overnight events noted.  Patient appeared to be alert and awake .       Objective:     VITALS:   Last 24hrs VS reviewed since prior progress note. Most recent are:  Patient Vitals for the past 24 hrs:   BP Temp Temp src Pulse Resp SpO2 Weight   02/04/24 0800 126/66 98.2 °F (36.8 °C) Axillary 87 19 92 % --   02/04/24 0646 -- -- -- -- -- -- 73.8 kg (162 lb 11.2 oz)   02/04/24 0315 129/74 98 °F (36.7 °C) Oral 86 -- 92 % --   02/03/24 2329 (!) 138/56 97.8 °F (36.6 °C) Oral 83 19 92 % --   02/03/24 1930 128/68 98.1 °F (36.7 °C) Oral 86 16 93 % --   02/03/24 1510 135/66 98.7 °F (37.1 °C) Oral 76 17 -- --         Intake/Output Summary (Last 24 hours) at 2/4/2024 1031  Last data filed at 2/4/2024 0543  Gross per 24 hour   Intake --   Output 1100 ml   Net -1100 ml        I had a face to face encounter and independently examined this patient on 2/4/2024, as outlined below:  PHYSICAL EXAM:  General:          Alert , awake and able to participate in meaning ful conversation , 2 lpm o2   EENT:              EOMI. Anicteric sclerae.  Resp:               CTA bilaterally, no wheezing or rales.  No accessory muscle use  CV:                  Regular  rhythm,  No edema  GI:                   Soft, Non distended, Non tender.  +Bowel sounds  Neurologic:       Alert  Skin:                
current orders and MAR    YES  PMH/SH reviewed - no change compared to H&P    Procedures: see electronic medical records for all procedures/Xrays and details which were not copied into this note but were reviewed prior to creation of Plan.      LABS:  I reviewed today's most current labs and imaging studies.  Pertinent labs include:  Recent Labs     02/02/24  0443 02/03/24  0346   WBC 6.8 7.6   HGB 8.1* 8.5*   HCT 28.3* 29.6*    317     Recent Labs     01/31/24  1215 02/02/24  0443 02/03/24  0346   NA  --  143 145   K  --  3.5 3.5   CL  --  111* 111*   CO2  --  30 31   GLUCOSE  --  128* 129*   BUN  --  24* 21*   CREATININE  --  1.26 1.27   CALCIUM 10.7* 11.0* 11.3*       Signed: Jocelin Lunsford MD         
not copied into this note but were reviewed prior to creation of Plan.      LABS:  I reviewed today's most current labs and imaging studies.  Pertinent labs include:  Recent Labs     01/30/24  0543 01/30/24 1809 01/31/24 0146 01/31/24  0855   WBC 12.6*  --  10.8  --    HGB 7.9* 8.5* 7.3* 7.8*   HCT 27.8* 28.9* 24.6* 26.9*     --  241  --      Recent Labs     01/30/24  0543 01/30/24 1809 01/31/24 0146 01/31/24  1215    146* 144  --    K 3.9 4.7 3.8  --    * 114* 115*  --    CO2 27 30 27  --    GLUCOSE 121* 145* 123*  --    BUN 24* 26* 25*  --    CREATININE 1.19 1.28 1.16  --    CALCIUM 10.8* 10.9* 10.6* 10.7*   LABALBU 1.9*  --  1.6*  --    BILITOT 0.8  --  0.7  --    AST 13*  --  12*  --    ALT 11*  --  9*  --        Signed: Jocelin Lunsford MD         
use  CV:  Irregular, tachycardiac rhythm,  No edema  GI:  Soft, Non distended, Non tender.  +Bowel sounds  Neurologic:  Lethargic  Psych:   Not anxious nor agitated  Skin:  No rashes.  No jaundice    Reviewed most current lab test results and cultures  YES  Reviewed most current radiology test results   YES  Review and summation of old records today    NO  Reviewed patient's current orders and MAR    YES  PMH/SH reviewed - no change compared to H&P    ________________________________________________________________________        Comments   >50% of visit spent in counseling and coordination of care     ________________________________________________________________________  Matty Gallardo Jr, MD     Procedures: see electronic medical records for all procedures/Xrays and details which were not copied into this note but were reviewed prior to creation of Plan.      LABS:  I reviewed today's most current labs and imaging studies.  Pertinent labs include:  Recent Labs     01/28/24 0339 01/28/24 1727 01/29/24  0544 01/29/24  2347 01/30/24  0543 01/30/24  1809   WBC 12.2*  --  10.6  --  12.6*  --    HGB 7.0*   < > 6.9* 9.4* 7.9* 8.5*   HCT 24.4*   < > 24.5* 30.9* 27.8* 28.9*     --  291  --  266  --     < > = values in this interval not displayed.       Recent Labs     01/28/24 0339 01/28/24  1727 01/29/24  0544 01/30/24  0543 01/30/24  1809   *   < > 147* 144 146*   K 4.3   < > 4.3 3.9 4.7   *   < > 118* 113* 114*   CO2 27   < > 25 27 30   GLUCOSE 141*   < > 143* 121* 145*   BUN 57*   < > 34* 24* 26*   CREATININE 1.47*   < > 1.27 1.19 1.28   CALCIUM 10.4*   < > 10.9* 10.8* 10.9*   LABALBU 2.2*  --  1.9* 1.9*  --    BILITOT 0.7  --  0.7 0.8  --    AST 19  --  14* 13*  --    ALT 12  --  11* 11*  --     < > = values in this interval not displayed.         Signed: Matty Gallardo Jr, MD